# Patient Record
Sex: MALE | Race: WHITE | NOT HISPANIC OR LATINO | Employment: FULL TIME | URBAN - NONMETROPOLITAN AREA
[De-identification: names, ages, dates, MRNs, and addresses within clinical notes are randomized per-mention and may not be internally consistent; named-entity substitution may affect disease eponyms.]

---

## 2018-05-02 ENCOUNTER — OFFICE VISIT (OUTPATIENT)
Dept: URGENT CARE | Facility: PHYSICIAN GROUP | Age: 18
End: 2018-05-02
Payer: COMMERCIAL

## 2018-05-02 VITALS
HEIGHT: 73 IN | BODY MASS INDEX: 29.69 KG/M2 | SYSTOLIC BLOOD PRESSURE: 128 MMHG | WEIGHT: 224 LBS | DIASTOLIC BLOOD PRESSURE: 80 MMHG | HEART RATE: 86 BPM | OXYGEN SATURATION: 100 % | TEMPERATURE: 97.9 F | RESPIRATION RATE: 16 BRPM

## 2018-05-02 DIAGNOSIS — M54.6 ACUTE RIGHT-SIDED THORACIC BACK PAIN: ICD-10-CM

## 2018-05-02 LAB
APPEARANCE UR: CLEAR
BILIRUB UR STRIP-MCNC: NEGATIVE MG/DL
COLOR UR AUTO: YELLOW
GLUCOSE UR STRIP.AUTO-MCNC: NEGATIVE MG/DL
KETONES UR STRIP.AUTO-MCNC: NEGATIVE MG/DL
LEUKOCYTE ESTERASE UR QL STRIP.AUTO: NEGATIVE
NITRITE UR QL STRIP.AUTO: NEGATIVE
PH UR STRIP.AUTO: 7.5 [PH] (ref 5–8)
PROT UR QL STRIP: NEGATIVE MG/DL
RBC UR QL AUTO: NEGATIVE
SP GR UR STRIP.AUTO: 1
UROBILINOGEN UR STRIP-MCNC: NEGATIVE MG/DL

## 2018-05-02 PROCEDURE — 99214 OFFICE O/P EST MOD 30 MIN: CPT | Performed by: FAMILY MEDICINE

## 2018-05-02 PROCEDURE — 81002 URINALYSIS NONAUTO W/O SCOPE: CPT | Performed by: FAMILY MEDICINE

## 2018-05-02 RX ORDER — PREDNISONE 20 MG/1
TABLET ORAL
Qty: 9 TAB | Refills: 0 | Status: SHIPPED | OUTPATIENT
Start: 2018-05-02 | End: 2018-10-08

## 2018-05-02 RX ORDER — TIZANIDINE 4 MG/1
TABLET ORAL
Qty: 28 TAB | Refills: 0 | Status: SHIPPED | OUTPATIENT
Start: 2018-05-02 | End: 2018-10-08

## 2018-05-02 NOTE — PROGRESS NOTES
Chief Complaint:    Chief Complaint   Patient presents with   • Back Pain       History of Present Illness:    This is a new problem. Has severe right-sided thoracic back pain since 4/30/18. Sometimes feels like back locks up. Felt a twinge in back helping friend move a dresser this past weekend and from there has gotten worse. No radiating pain down legs. No loss of bowel/bladder control. No medications taken for this. No history of chronic back pain. 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      Review of Systems:    Constitutional: Negative for fever, chills, and diaphoresis.   Eyes: Negative for change in vision, photophobia, pain, redness, and discharge.  ENT: Negative for ear pain, ear discharge, hearing loss, tinnitus, nasal congestion, nosebleeds, and sore throat.    Respiratory: Negative for cough, hemoptysis, sputum production, shortness of breath, wheezing, and stridor.    Cardiovascular: Negative for chest pain, palpitations, orthopnea, claudication, leg swelling, and PND.   Gastrointestinal: Negative for abdominal pain, nausea, vomiting, diarrhea, constipation, blood in stool, and melena.   Genitourinary: Negative for dysuria, urinary urgency, urinary frequency, hematuria, and flank pain.   Musculoskeletal: See HPI.  Skin: Negative for rash and itching.   Neurological: Negative for dizziness, tingling, tremors, sensory change, speech change, focal weakness, seizures, loss of consciousness, and headaches.   Endo: Negative for polydipsia.   Heme: Does not bruise/bleed easily.   Psychiatric/Behavioral: Negative for depression, suicidal ideas, hallucinations, memory loss and substance abuse. The patient is not nervous/anxious and does not have insomnia.        Past Medical History:    History reviewed. No pertinent past medical history.    Past Surgical History:    History reviewed. No pertinent surgical history.    Social  "History:    Social History     Social History   • Marital status: Single     Spouse name: N/A   • Number of children: N/A   • Years of education: N/A     Occupational History   • Not on file.     Social History Main Topics   • Smoking status: Never Smoker   • Smokeless tobacco: Never Used   • Alcohol use No   • Drug use: No   • Sexual activity: Not on file     Other Topics Concern   • Not on file     Social History Narrative   • No narrative on file     Family History:    History reviewed. No pertinent family history.    Medications:    No current outpatient prescriptions on file prior to visit.     No current facility-administered medications on file prior to visit.      Allergies:    No Known Allergies      Vitals:    Vitals:    05/02/18 1151   BP: 128/80   Pulse: 86   Resp: 16   Temp: 36.6 °C (97.9 °F)   SpO2: 100%   Weight: 101.6 kg (224 lb)   Height: 1.854 m (6' 1\")       Physical Exam:    Constitutional: Vital signs reviewed. Appears well-developed and well-nourished. Appears to have discomfort due to right-sided back pain.   Eyes: Sclera white, conjunctivae clear.  ENT: External ears normal. Hearing normal.  Pulmonary/Chest: Respirations non-labored.  Musculoskeletal: Mildly tender to palpation right thoracic back region. Normal gait. Able to do normal lumbar range of motion, but rotation caused increased sudden pain in right thoracic back region, like it locked up. No muscular atrophy or weakness.  Neurological: Alert and oriented to person, place, and time. Muscle tone normal. Coordination normal. Light touch and sensation normal.   Skin: No rashes or lesions. Warm, dry, normal turgor.  Psychiatric: Normal mood and affect. Behavior is normal. Judgment and thought content normal.     Diagnostics:    POCT URINALYSIS (Order #[id number]) on 5/2/18   Component Results     Component Value Ref Range & Units Status   POC Color Yellow  Negative Final   POC Appearance Clear  Negative Final   POC Leukocyte Esterase " Negative  Negative Final   POC Nitrites Negative  Negative Final   POC Urobiligen Negative  Negative (0.2) mg/dL Final   POC Protein Negative  Negative mg/dL Final   POC Urine PH 7.5  5.0 - 8.0 Final   POC Blood Negative  Negative Final   POC Specific Gravity 1.005  <1.005 - >1.030 Final   POC Ketones Negative  Negative mg/dL Final   POC Bilirubin Negative  Negative mg/dL Final   POC Glucose Negative  Negative mg/dL Final   Last Resulted Time   Wed May 2, 2018 12:15 PM       Assessment / Plan:    1. Acute right-sided thoracic back pain  - POCT Urinalysis  - predniSONE (DELTASONE) 20 MG Tab; 2 TABS ONCE A DAY ON DAYS 1-3, 1 TAB ONCE A DAY ON DAYS 4-6. TAKE WITH FOOD.  Dispense: 9 Tab; Refill: 0  - tizanidine (ZANAFLEX) 4 MG Tab; 1 TAB EVERY 6 HOURS ONLY IF NEEDED FOR PAIN, MUSCLE SPASM, AND/OR MUSCLE TIGHTNESS. MAY CAUSE DROWSINESS.  Dispense: 28 Tab; Refill: 0      Work note given - excuse for 5/2 thru and including 5/4/18.     Discussed with him DDX and management options.    Likely inflammation and/or muscle tightness/spasm as cause of symptoms.    Rec'd relative rest.    Declines Toradol IM.    Agreeable to medications prescribed.    Follow-up with PCP or urgent care if getting worse or not better with time and above.

## 2018-05-02 NOTE — LETTER
May 2, 2018         Patient: Cooper Caldwell   YOB: 2000   Date of Visit: 5/2/2018           To Whom it May Concern:    Cooper Caldwell was seen in my clinic on 5/2/2018.     Please excuse from work for 5/2 thru and including 5/4/18 due to medical condition.    If you have any questions or concerns, please don't hesitate to call.        Sincerely,           Sascha Madison M.D.  Electronically Signed

## 2018-06-06 ENCOUNTER — NON-PROVIDER VISIT (OUTPATIENT)
Dept: URGENT CARE | Facility: PHYSICIAN GROUP | Age: 18
End: 2018-06-06

## 2018-06-06 DIAGNOSIS — Z02.1 PRE-EMPLOYMENT DRUG SCREENING: ICD-10-CM

## 2018-06-06 LAB
AMP AMPHETAMINE: NORMAL
COC COCAINE: NORMAL
INT CON NEG: NORMAL
INT CON POS: NORMAL
MET METHAMPHETAMINES: NORMAL
OPI OPIATES: NORMAL
PCP PHENCYCLIDINE: NORMAL
POC DRUG COMMENT 753798-OCCUPATIONAL HEALTH: NORMAL
THC: NORMAL

## 2018-06-06 PROCEDURE — 80305 DRUG TEST PRSMV DIR OPT OBS: CPT | Performed by: PHYSICIAN ASSISTANT

## 2018-10-08 ENCOUNTER — HOSPITAL ENCOUNTER (EMERGENCY)
Facility: MEDICAL CENTER | Age: 18
End: 2018-10-08
Attending: EMERGENCY MEDICINE

## 2018-10-08 VITALS
OXYGEN SATURATION: 97 % | DIASTOLIC BLOOD PRESSURE: 86 MMHG | TEMPERATURE: 96.4 F | HEIGHT: 73 IN | RESPIRATION RATE: 16 BRPM | SYSTOLIC BLOOD PRESSURE: 130 MMHG | BODY MASS INDEX: 27.76 KG/M2 | WEIGHT: 209.44 LBS | HEART RATE: 72 BPM

## 2018-10-08 DIAGNOSIS — R10.13 EPIGASTRIC ABDOMINAL PAIN: ICD-10-CM

## 2018-10-08 LAB
ALBUMIN SERPL BCP-MCNC: 4.5 G/DL (ref 3.2–4.9)
ALBUMIN/GLOB SERPL: 1.7 G/DL
ALP SERPL-CCNC: 73 U/L (ref 30–99)
ALT SERPL-CCNC: 18 U/L (ref 2–50)
ANION GAP SERPL CALC-SCNC: 9 MMOL/L (ref 0–11.9)
APPEARANCE UR: CLEAR
AST SERPL-CCNC: 18 U/L (ref 12–45)
BASOPHILS # BLD AUTO: 0.6 % (ref 0–1.8)
BASOPHILS # BLD: 0.06 K/UL (ref 0–0.12)
BILIRUB SERPL-MCNC: 0.4 MG/DL (ref 0.1–1.5)
BILIRUB UR QL STRIP.AUTO: NEGATIVE
BUN SERPL-MCNC: 14 MG/DL (ref 8–22)
CALCIUM SERPL-MCNC: 9.9 MG/DL (ref 8.5–10.5)
CHLORIDE SERPL-SCNC: 105 MMOL/L (ref 96–112)
CO2 SERPL-SCNC: 26 MMOL/L (ref 20–33)
COLOR UR: YELLOW
CREAT SERPL-MCNC: 0.8 MG/DL (ref 0.5–1.4)
EOSINOPHIL # BLD AUTO: 0.2 K/UL (ref 0–0.51)
EOSINOPHIL NFR BLD: 2 % (ref 0–6.9)
ERYTHROCYTE [DISTWIDTH] IN BLOOD BY AUTOMATED COUNT: 36.7 FL (ref 35.9–50)
GLOBULIN SER CALC-MCNC: 2.7 G/DL (ref 1.9–3.5)
GLUCOSE SERPL-MCNC: 89 MG/DL (ref 65–99)
GLUCOSE UR STRIP.AUTO-MCNC: NEGATIVE MG/DL
HCT VFR BLD AUTO: 43.3 % (ref 42–52)
HGB BLD-MCNC: 15.7 G/DL (ref 14–18)
IMM GRANULOCYTES # BLD AUTO: 0.02 K/UL (ref 0–0.11)
IMM GRANULOCYTES NFR BLD AUTO: 0.2 % (ref 0–0.9)
KETONES UR STRIP.AUTO-MCNC: NEGATIVE MG/DL
LEUKOCYTE ESTERASE UR QL STRIP.AUTO: NEGATIVE
LIPASE SERPL-CCNC: 35 U/L (ref 11–82)
LYMPHOCYTES # BLD AUTO: 3.2 K/UL (ref 1–4.8)
LYMPHOCYTES NFR BLD: 31.5 % (ref 22–41)
MCH RBC QN AUTO: 31 PG (ref 27–33)
MCHC RBC AUTO-ENTMCNC: 36.3 G/DL (ref 33.7–35.3)
MCV RBC AUTO: 85.4 FL (ref 81.4–97.8)
MICRO URNS: NORMAL
MONOCYTES # BLD AUTO: 0.8 K/UL (ref 0–0.85)
MONOCYTES NFR BLD AUTO: 7.9 % (ref 0–13.4)
NEUTROPHILS # BLD AUTO: 5.89 K/UL (ref 1.82–7.42)
NEUTROPHILS NFR BLD: 57.8 % (ref 44–72)
NITRITE UR QL STRIP.AUTO: NEGATIVE
NRBC # BLD AUTO: 0 K/UL
NRBC BLD-RTO: 0 /100 WBC
PH UR STRIP.AUTO: 8 [PH]
PLATELET # BLD AUTO: 220 K/UL (ref 164–446)
PMV BLD AUTO: 10.3 FL (ref 9–12.9)
POTASSIUM SERPL-SCNC: 3.6 MMOL/L (ref 3.6–5.5)
PROT SERPL-MCNC: 7.2 G/DL (ref 6–8.2)
PROT UR QL STRIP: NEGATIVE MG/DL
RBC # BLD AUTO: 5.07 M/UL (ref 4.7–6.1)
RBC UR QL AUTO: NEGATIVE
SODIUM SERPL-SCNC: 140 MMOL/L (ref 135–145)
SP GR UR STRIP.AUTO: 1.01
UROBILINOGEN UR STRIP.AUTO-MCNC: 0.2 MG/DL
WBC # BLD AUTO: 10.2 K/UL (ref 4.8–10.8)

## 2018-10-08 PROCEDURE — 85025 COMPLETE CBC W/AUTO DIFF WBC: CPT

## 2018-10-08 PROCEDURE — 83690 ASSAY OF LIPASE: CPT

## 2018-10-08 PROCEDURE — 99284 EMERGENCY DEPT VISIT MOD MDM: CPT

## 2018-10-08 PROCEDURE — 81003 URINALYSIS AUTO W/O SCOPE: CPT

## 2018-10-08 PROCEDURE — 80053 COMPREHEN METABOLIC PANEL: CPT

## 2018-10-08 ASSESSMENT — PAIN SCALES - GENERAL: PAINLEVEL_OUTOF10: 2

## 2018-10-08 NOTE — ED TRIAGE NOTES
"Pt to triage .  Chief Complaint   Patient presents with   • Epigastric Pain     off/on    • Tired     \"I feel like I am out of engery\"   slight nausea this am   "

## 2018-10-08 NOTE — ED PROVIDER NOTES
"ED Provider Note    Scribed for Hua Phelan M.D. by Anastasia Fernando. 10/8/2018  3:29 PM    Primary care provider: Pcp Pt States None  Means of arrival: Walk-In  History obtained from: Patient  History limited by: None    CHIEF COMPLAINT  Chief Complaint   Patient presents with   • Epigastric Pain     off/on    • Tired     \"I feel like I am out of engery\"     HPI  Cooper Caldwell is a 18 y.o. male who presents to the Emergency Department complaining of left intermittent upper quadrant pain just beneath his ribs described as a sharp 4/10 pain starting today. Reports intermittent pain with deep inspiration and walking. Additionally, he reports feeling more fatigued than usual feeling like \"I am out of energy\" while at work today. Patient was nauseated this morning and had an episode of emesis which has since resolved. Denies any pertinent past medical history. Denies sore throat. Does not regularly take medications.    REVIEW OF SYSTEMS  Pertinent positives include abdominal pain, nausea, emesis, fatigue. Pertinent negatives include no sore throat, cough.   See HPI for further details.     PAST MEDICAL HISTORY    History reviewed. No pertinent past medical history.    SURGICAL HISTORY  patient denies any surgical history    SOCIAL HISTORY  Social History   Substance Use Topics   • Smoking status: Never Smoker   • Smokeless tobacco: Never Used   • Alcohol use No      History   Drug Use No       FAMILY HISTORY  History reviewed. No pertinent family history.    CURRENT MEDICATIONS  Home Medications     Reviewed by Gracia Anthony R.N. (Registered Nurse) on 10/08/18 at 1512  Med List Status: Complete   Medication Last Dose Status        Patient Varinder Taking any Medications                       ALLERGIES  No Known Allergies    PHYSICAL EXAM  VITAL SIGNS: /73   Pulse 72   Temp (!) 35.8 °C (96.4 °F)   Resp 14   Ht 1.854 m (6' 1\")   Wt 95 kg (209 lb 7 oz)   SpO2 100%   BMI 27.63 kg/m²     Constitutional: Well " developed, Well nourished,no acute distress, Non-toxic appearance.   HENT: Normocephalic, Atraumatic.  Oropharynx moist.   Eyes: PERRL, EOMI, Conjunctiva normal, No discharge.   Neck: no anterior cervical lymphadenopathy  CV: Good pulses  Thorax & Lungs: No respiratory distress.   Abdomen: Mild left upper quadrant tenderness. Soft, No rebound, no peritoneal signs.   Skin: Warm, Dry, No erythema, No rash.    Musculoskeletal: No major deformities noted.   Neurologic: Awake, alert. Moves all extremities spontaneously.  Psychiatric: Affect normal, Mood normal.       LABS  Results for orders placed or performed during the hospital encounter of 10/08/18   CBC WITH DIFFERENTIAL   Result Value Ref Range    WBC 10.2 4.8 - 10.8 K/uL    RBC 5.07 4.70 - 6.10 M/uL    Hemoglobin 15.7 14.0 - 18.0 g/dL    Hematocrit 43.3 42.0 - 52.0 %    MCV 85.4 81.4 - 97.8 fL    MCH 31.0 27.0 - 33.0 pg    MCHC 36.3 (H) 33.7 - 35.3 g/dL    RDW 36.7 35.9 - 50.0 fL    Platelet Count 220 164 - 446 K/uL    MPV 10.3 9.0 - 12.9 fL    Neutrophils-Polys 57.80 44.00 - 72.00 %    Lymphocytes 31.50 22.00 - 41.00 %    Monocytes 7.90 0.00 - 13.40 %    Eosinophils 2.00 0.00 - 6.90 %    Basophils 0.60 0.00 - 1.80 %    Immature Granulocytes 0.20 0.00 - 0.90 %    Nucleated RBC 0.00 /100 WBC    Neutrophils (Absolute) 5.89 1.82 - 7.42 K/uL    Lymphs (Absolute) 3.20 1.00 - 4.80 K/uL    Monos (Absolute) 0.80 0.00 - 0.85 K/uL    Eos (Absolute) 0.20 0.00 - 0.51 K/uL    Baso (Absolute) 0.06 0.00 - 0.12 K/uL    Immature Granulocytes (abs) 0.02 0.00 - 0.11 K/uL    NRBC (Absolute) 0.00 K/uL   COMP METABOLIC PANEL   Result Value Ref Range    Sodium 140 135 - 145 mmol/L    Potassium 3.6 3.6 - 5.5 mmol/L    Chloride 105 96 - 112 mmol/L    Co2 26 20 - 33 mmol/L    Anion Gap 9.0 0.0 - 11.9    Glucose 89 65 - 99 mg/dL    Bun 14 8 - 22 mg/dL    Creatinine 0.80 0.50 - 1.40 mg/dL    Calcium 9.9 8.5 - 10.5 mg/dL    AST(SGOT) 18 12 - 45 U/L    ALT(SGPT) 18 2 - 50 U/L    Alkaline  Phosphatase 73 30 - 99 U/L    Total Bilirubin 0.4 0.1 - 1.5 mg/dL    Albumin 4.5 3.2 - 4.9 g/dL    Total Protein 7.2 6.0 - 8.2 g/dL    Globulin 2.7 1.9 - 3.5 g/dL    A-G Ratio 1.7 g/dL   LIPASE   Result Value Ref Range    Lipase 35 11 - 82 U/L   URINALYSIS,CULTURE IF INDICATED   Result Value Ref Range    Color Yellow     Character Clear     Specific Gravity 1.011 <1.035    Ph 8.0 5.0 - 8.0    Glucose Negative Negative mg/dL    Ketones Negative Negative mg/dL    Protein Negative Negative mg/dL    Bilirubin Negative Negative    Urobilinogen, Urine 0.2 Negative    Nitrite Negative Negative    Leukocyte Esterase Negative Negative    Occult Blood Negative Negative    Micro Urine Req see below    ESTIMATED GFR   Result Value Ref Range    GFR If African American >60 >60 mL/min/1.73 m 2    GFR If Non African American >60 >60 mL/min/1.73 m 2     All labs reviewed by me.    COURSE & MEDICAL DECISION MAKING  Nursing notes, VS, PMSFHx reviewed in chart.    3:29 PM - Patient seen and examined at bedside. Plan of care was discussed which is to order labs to further evaluate. Ordered CBC with differential, CMP, lipase, and urinalysis to evaluate his symptoms.     6:02 PM - Recheck. Patient does feel improved since being in the ED and with laying down however he does report some mild abdominal pain. I do not find any significant or concerning findings given his lab results. I have discussed the possibility of his symptoms being related to viral syndrome. He was given ED return precautions and discharged home. He verbalized his understanding and agrees to the discharge instructions.    The patient will return for new or worsening symptoms and is stable at the time of discharge.    The patient is referred to a primary physician for blood pressure management, diabetic screening, and for all other preventative health concerns.    DISPOSITION:  Patient will be discharged home in stable condition.    FOLLOW UP:  No follow-up provider  specified.    OUTPATIENT MEDICATIONS:  There are no discharge medications for this patient.        FINAL IMPRESSION  1. Epigastric abdominal pain          IAnastasia (Scribe), am scribing for, and in the presence of, Hua Phelan M.D..    Electronically signed by: Anastasia Fernando (Scribe), 10/8/2018    IHua M.D. personally performed the services described in this documentation, as scribed by Anastasia Fernando in my presence, and it is both accurate and complete. E    The note accurately reflects work and decisions made by me.  Hua Phelan  10/8/2018  6:31 PM

## 2018-10-09 NOTE — ED NOTES
RN went through discharge paperwork with the pt. And the pts. Significant other. Pt. Was receptive to instructions and stated he plans to follow up with his PCP

## 2019-04-03 ENCOUNTER — OFFICE VISIT (OUTPATIENT)
Dept: URGENT CARE | Facility: PHYSICIAN GROUP | Age: 19
End: 2019-04-03
Payer: COMMERCIAL

## 2019-04-03 VITALS
OXYGEN SATURATION: 97 % | RESPIRATION RATE: 16 BRPM | HEART RATE: 70 BPM | BODY MASS INDEX: 27.44 KG/M2 | WEIGHT: 208 LBS | DIASTOLIC BLOOD PRESSURE: 70 MMHG | TEMPERATURE: 98.5 F | SYSTOLIC BLOOD PRESSURE: 106 MMHG

## 2019-04-03 DIAGNOSIS — K08.89 PAIN, DENTAL: ICD-10-CM

## 2019-04-03 PROCEDURE — 99214 OFFICE O/P EST MOD 30 MIN: CPT | Performed by: NURSE PRACTITIONER

## 2019-04-03 RX ORDER — CLINDAMYCIN HYDROCHLORIDE 300 MG/1
300 CAPSULE ORAL 3 TIMES DAILY
Qty: 21 QUANTITY SUFFICIENT | Refills: 0 | Status: SHIPPED | OUTPATIENT
Start: 2019-04-03 | End: 2020-07-15

## 2019-04-03 RX ORDER — IBUPROFEN 800 MG/1
800 TABLET ORAL EVERY 8 HOURS PRN
Qty: 90 TAB | Refills: 0 | Status: SHIPPED | OUTPATIENT
Start: 2019-04-03 | End: 2020-07-15

## 2019-04-03 ASSESSMENT — ENCOUNTER SYMPTOMS
MYALGIAS: 0
FEVER: 0
CHILLS: 0
NAUSEA: 0
HEADACHES: 0

## 2019-04-03 NOTE — LETTER
April 3, 2019        Cooper Caldwell  [address] [facility] [address]        Cooper was seen in our clinic today and he is excused from work. Thank you.  If you have any questions or concerns, please don't hesitate to call.        Sincerely,        LOVE Garvey.P.SHANITA.    Electronically Signed

## 2019-04-03 NOTE — PROGRESS NOTES
Subjective:      Cooper Caldwell is a 18 y.o. male who presents with Dental Pain (Dental Appointment 04/12) and Diarrhea (x 4 days  after taking antibiotic from dentist )            HPI New problem. 18 year old male with dental pain for past week and a half and diarrhea for 4 days. He feels the diarrhea is from amoxicillin. He is also taking ibuprofen and tylenol as well as tried naprosyn. Has dental appt on 12 April. No fever, chills or myalgia.   Patient has no known allergies.  No current outpatient prescriptions on file prior to visit.     No current facility-administered medications on file prior to visit.      Social History     Social History   • Marital status: Single     Spouse name: N/A   • Number of children: N/A   • Years of education: N/A     Occupational History   • Not on file.     Social History Main Topics   • Smoking status: Never Smoker   • Smokeless tobacco: Never Used   • Alcohol use No   • Drug use: No   • Sexual activity: Not on file     Other Topics Concern   • Not on file     Social History Narrative   • No narrative on file     family history is not on file.      Review of Systems   Constitutional: Negative for chills, fever and malaise/fatigue.   HENT:        +dental pain   Gastrointestinal: Negative for nausea.   Musculoskeletal: Negative for myalgias.   Neurological: Negative for headaches.          Objective:     /70   Pulse 70   Temp 36.9 °C (98.5 °F) (Temporal)   Resp 16   Wt 94.3 kg (208 lb)   SpO2 97%   BMI 27.44 kg/m²      Physical Exam   Constitutional: He is oriented to person, place, and time. He appears well-developed and well-nourished. No distress.   HENT:   Head: Normocephalic and atraumatic.   Right Ear: External ear and ear canal normal. Tympanic membrane is not injected and not perforated. No middle ear effusion.   Left Ear: External ear and ear canal normal. Tympanic membrane is not injected and not perforated.  No middle ear effusion.   Nose: Mucosal edema  present.   Mouth/Throat: Abnormal dentition. Dental caries present. No oropharyngeal exudate or posterior oropharyngeal erythema.   Eyes: Conjunctivae are normal. Right eye exhibits no discharge. Left eye exhibits no discharge.   Neck: Normal range of motion. Neck supple.   Cardiovascular: Normal rate, regular rhythm and normal heart sounds.    No murmur heard.  Pulmonary/Chest: Effort normal and breath sounds normal. No respiratory distress.   Musculoskeletal: Normal range of motion.   Normal movement of all 4 extremities.   Lymphadenopathy:     He has no cervical adenopathy.        Right: No supraclavicular adenopathy present.        Left: No supraclavicular adenopathy present.   Neurological: He is alert and oriented to person, place, and time. Gait normal.   Skin: Skin is warm and dry.   Psychiatric: He has a normal mood and affect. His behavior is normal. Thought content normal.   Nursing note and vitals reviewed.              Assessment/Plan:     1. Pain, dental  clindamycin (CLEOCIN) 300 MG Cap    ibuprofen (MOTRIN) 800 MG Tab      patient educated on every 4 hour dosing of extra strength tylenol and motrin.  Clindamycin.  Differential diagnosis, natural history, supportive care, and indications for immediate follow-up discussed at length.

## 2019-05-08 ENCOUNTER — APPOINTMENT (OUTPATIENT)
Dept: URGENT CARE | Facility: CLINIC | Age: 19
End: 2019-05-08
Payer: COMMERCIAL

## 2019-06-18 ENCOUNTER — OFFICE VISIT (OUTPATIENT)
Dept: URGENT CARE | Facility: PHYSICIAN GROUP | Age: 19
End: 2019-06-18
Payer: COMMERCIAL

## 2019-06-18 VITALS
TEMPERATURE: 99 F | HEART RATE: 68 BPM | WEIGHT: 206 LBS | OXYGEN SATURATION: 99 % | DIASTOLIC BLOOD PRESSURE: 70 MMHG | RESPIRATION RATE: 16 BRPM | BODY MASS INDEX: 27.18 KG/M2 | SYSTOLIC BLOOD PRESSURE: 122 MMHG

## 2019-06-18 DIAGNOSIS — S60.221A CONTUSION OF RIGHT HAND, INITIAL ENCOUNTER: ICD-10-CM

## 2019-06-18 PROCEDURE — 99214 OFFICE O/P EST MOD 30 MIN: CPT | Performed by: PHYSICIAN ASSISTANT

## 2019-06-18 NOTE — LETTER
June 18, 2019         Patient: Cooper Caldwell   YOB: 2000   Date of Visit: 6/18/2019           To Whom it May Concern:    Cooper Caldwell was seen in my clinic on 6/18/2019. He may return to work on 6/19/19. He will visit the [facility] urgent care to have and xray of his right hand 6/19/19. Please allow limited use of his right hand.    If you have any questions or concerns, please don't hesitate to call.        Sincerely,           Vicki Briscoe P.A.-C.  Electronically Signed

## 2019-06-19 ENCOUNTER — APPOINTMENT (OUTPATIENT)
Dept: RADIOLOGY | Facility: IMAGING CENTER | Age: 19
End: 2019-06-19
Attending: PHYSICIAN ASSISTANT
Payer: COMMERCIAL

## 2019-06-19 DIAGNOSIS — S60.221A CONTUSION OF RIGHT HAND, INITIAL ENCOUNTER: ICD-10-CM

## 2019-06-19 PROCEDURE — 73130 X-RAY EXAM OF HAND: CPT | Mod: 26,RT | Performed by: NURSE PRACTITIONER

## 2019-06-19 NOTE — PROGRESS NOTES
Chief Complaint   Patient presents with   • Hand Pain     hit hand while working on truck (R) hand index and middle finger hurt the worst        HISTORY OF PRESENT ILLNESS: Patient is a 19 y.o. male who presents today for the following:    Patient comes in for evaluation of pain in his right hand.  He was working on his truck yesterday when his hand slipped off the wrench, jamming his hand into the truck.  He has pain on digits 2 and 3.  He has pain with range of motion and any pressure in the area.  He is right-hand dominant.  Over-the-counter medication has not been helping very much.    There are no active problems to display for this patient.      Allergies:Patient has no known allergies.    Current Outpatient Prescriptions Ordered in [facility]   Medication Sig Dispense Refill   • clindamycin (CLEOCIN) 300 MG Cap Take 1 Cap by mouth 3 times a day. (Patient not taking: Reported on 6/18/2019) 21 Quantity Sufficient 0   • ibuprofen (MOTRIN) 800 MG Tab Take 1 Tab by mouth every 8 hours as needed. (Patient not taking: Reported on 6/18/2019) 90 Tab 0     No current Epic-ordered facility-administered medications on file.        No past medical history on file.    Social History   Substance Use Topics   • Smoking status: Never Smoker   • Smokeless tobacco: Never Used   • Alcohol use No       No family status information on file.   No family history on file.    Review of Systems:    Gastrointestinal ROS: No change in bowel habits, No significant change in appetite, No nausea, vomiting, diarrhea, or constipation  Musculoskeletal/Extremities ROS: Right hand pain.  Hematologic/Lymphatic ROS: No chills, No night sweats, No weight loss  Skin/Integumentary ROS: No edema, No evidence of rash, No itching      Exam:  /70   Pulse 68   Temp 37.2 °C (99 °F)   Resp 16   Wt 93.4 kg (206 lb)   SpO2 99%   General: Well developed, well nourished. No distress.  Pulmonary: Unlabored respiratory effort.   Extremities: Tenderness noted  at MCPs 2 and 3 of the right hand extending into the proximal phalanges.  Decreased range of motion at the MCPs due to pain.  Neurologic: Grossly nonfocal. No facial asymmetry noted.  Skin: Warm, dry, good turgor. No rashes in visible areas.   Psych: Normal mood. Alert and oriented x3. Judgment and insight is normal.    X-ray is unavailable at this time.  Splint was placed on the right hand immobilizing MCPs 2 and 3 until he is able to obtain x-ray tomorrow.      Assessment/Plan:  Will contact patient with x-ray results on Thursday.  Will refer to orthopedics if needed.  Otherwise recommend ibuprofen/acetaminophen as needed for pain.  Splint as needed for comfort.   1. Contusion of right hand, initial encounter  DX-HAND 3+ RIGHT     Right hand xray, per radiology:  Impression       No acute fracture identified.

## 2019-06-20 ENCOUNTER — TELEPHONE (OUTPATIENT)
Dept: URGENT CARE | Facility: PHYSICIAN GROUP | Age: 19
End: 2019-06-20

## 2019-06-20 NOTE — TELEPHONE ENCOUNTER
----- Message from Vicki Briscoe P.A.-C. sent at 6/19/2019 10:18 AM PDT -----  Please let pt know his xray is negative for fracture. He can wear that splint as needed for comfort but does not need to wear it.

## 2020-05-18 ENCOUNTER — OFFICE VISIT (OUTPATIENT)
Dept: URGENT CARE | Facility: PHYSICIAN GROUP | Age: 20
End: 2020-05-18
Payer: COMMERCIAL

## 2020-05-18 VITALS
BODY MASS INDEX: 31.14 KG/M2 | DIASTOLIC BLOOD PRESSURE: 62 MMHG | HEART RATE: 76 BPM | RESPIRATION RATE: 16 BRPM | WEIGHT: 236 LBS | OXYGEN SATURATION: 97 % | TEMPERATURE: 97.8 F | SYSTOLIC BLOOD PRESSURE: 124 MMHG

## 2020-05-18 DIAGNOSIS — M62.838 MUSCLE SPASM: ICD-10-CM

## 2020-05-18 DIAGNOSIS — S43.401A SPRAIN OF RIGHT SHOULDER, UNSPECIFIED SHOULDER SPRAIN TYPE, INITIAL ENCOUNTER: ICD-10-CM

## 2020-05-18 PROCEDURE — 99214 OFFICE O/P EST MOD 30 MIN: CPT | Performed by: PHYSICIAN ASSISTANT

## 2020-05-18 RX ORDER — CYCLOBENZAPRINE HCL 10 MG
10 TABLET ORAL 3 TIMES DAILY PRN
Qty: 14 TAB | Refills: 0 | Status: SHIPPED | OUTPATIENT
Start: 2020-05-18 | End: 2023-08-17

## 2020-05-18 ASSESSMENT — ENCOUNTER SYMPTOMS
FEVER: 0
BACK PAIN: 1
TINGLING: 0
SENSORY CHANGE: 0
CHILLS: 0

## 2020-05-18 ASSESSMENT — FIBROSIS 4 INDEX: FIB4 SCORE: 0.42

## 2020-05-18 ASSESSMENT — PAIN SCALES - GENERAL: PAINLEVEL: 7=MODERATE-SEVERE PAIN

## 2020-05-18 NOTE — LETTER
LIANET  [facility] [address] [address]     May 18, 2020    Patient: Cooper Caldwell   YOB: 2000   Date of Visit: 5/18/2020       To Whom It May Concern:    Cooper Caldwell was seen and treated in our department on 5/18/2020. Please excuse from work tomorrow 05/19/20.     Sincerely,     Wander Alexandre P.A.-C.

## 2020-05-19 NOTE — PROGRESS NOTES
Subjective:      Cooper Caldwell is a 20 y.o. male who presents with Shoulder Pain (R shoulder pain x1d)            Shoulder Pain   Pertinent negatives include no chills or fever.   Patient is a 20-year-old male who presents with right shoulder pain.  He states he was working on changing his transmission on his automobile 2 nights ago.  This required strenuous repetitive physical activity with his arms and jerking motions.  He also continued strenuous physical activity throughout the day yesterday.  He states at 1 point yesterday he may have strained his right shoulder with a jerking shoulder flexed motion.  He has mild pain to his right shoulder exacerbated with movements and range of motion. Moderate trapezius pain and soreness. Denies any fevers, chills, numbness, tingling or weakness.  Denies any history of rotator cuff tear or shoulder injury. No other injuries.     Review of Systems   Constitutional: Negative for chills, fever and malaise/fatigue.   Musculoskeletal: Positive for back pain and joint pain.   Neurological: Negative for tingling and sensory change.          Objective:     /62   Pulse 76   Temp 36.6 °C (97.8 °F) (Temporal)   Resp 16   Wt 107 kg (236 lb)   SpO2 97%   BMI 31.14 kg/m²      Physical Exam  Vitals signs reviewed.   Constitutional:       General: He is not in acute distress.     Appearance: Normal appearance. He is not ill-appearing or toxic-appearing.   Eyes:      Conjunctiva/sclera: Conjunctivae normal.      Pupils: Pupils are equal, round, and reactive to light.   Cardiovascular:      Rate and Rhythm: Normal rate.   Pulmonary:      Effort: Pulmonary effort is normal.   Musculoskeletal:      Comments: Right shoulder: Full flexion extension abduction and abduction.  Pain with full flexion.   Strength 5 out of 5 to flexion extension.   Negative empty can test.  Negative Sneed test.  Tenderness to palpation to anterior shoulder.  Tenderness to palpation to trapezius area.  Muscle  spasm to posterior trapezius.  No bony tenderness, deformity, crepitus or overlying skin changes.  Sensation intact.  Distal capillary refill intact.  Upper extremities 5/5 strength  Equal  strength.    Lymphadenopathy:      Cervical: No cervical adenopathy.   Skin:     General: Skin is warm and dry.   Neurological:      General: No focal deficit present.      Mental Status: He is alert and oriented to person, place, and time.   Psychiatric:         Mood and Affect: Mood normal.         Behavior: Behavior normal.       History reviewed. No pertinent past medical history. History reviewed. No pertinent surgical history.   Social History     Socioeconomic History   • Marital status: Single     Spouse name: Not on file   • Number of children: Not on file   • Years of education: Not on file   • Highest education level: Not on file   Occupational History   • Not on file   Social Needs   • Financial resource strain: Not on file   • Food insecurity     Worry: Not on file     Inability: Not on file   • Transportation needs     Medical: Not on file     Non-medical: Not on file   Tobacco Use   • Smoking status: Never Smoker   • Smokeless tobacco: Never Used   Substance and Sexual Activity   • Alcohol use: No   • Drug use: No   • Sexual activity: Not on file   Lifestyle   • Physical activity     Days per week: Not on file     Minutes per session: Not on file   • Stress: Not on file   Relationships   • Social connections     Talks on phone: Not on file     Gets together: Not on file     Attends [employer] service: Not on file     Active member of club or organization: Not on file     Attends meetings of clubs or organizations: Not on file     Relationship status: Not on file   • Intimate partner violence     Fear of current or ex partner: Not on file     Emotionally abused: Not on file     Physically abused: Not on file     Forced sexual activity: Not on file   Other Topics Concern   • Not on file   Social History Narrative    • Not on file    Patient has no known allergies.          History reviewed. No pertinent past medical history. History reviewed. No pertinent surgical history.   Social History     Socioeconomic History   • Marital status: Single     Spouse name: Not on file   • Number of children: Not on file   • Years of education: Not on file   • Highest education level: Not on file   Occupational History   • Not on file   Social Needs   • Financial resource strain: Not on file   • Food insecurity     Worry: Not on file     Inability: Not on file   • Transportation needs     Medical: Not on file     Non-medical: Not on file   Tobacco Use   • Smoking status: Never Smoker   • Smokeless tobacco: Never Used   Substance and Sexual Activity   • Alcohol use: No   • Drug use: No   • Sexual activity: Not on file   Lifestyle   • Physical activity     Days per week: Not on file     Minutes per session: Not on file   • Stress: Not on file   Relationships   • Social connections     Talks on phone: Not on file     Gets together: Not on file     Attends [employer]rian[employer] service: Not on file     Active member of club or organization: Not on file     Attends meetings of clubs or organizations: Not on file     Relationship status: Not on file   • Intimate partner violence     Fear of current or ex partner: Not on file     Emotionally abused: Not on file     Physically abused: Not on file     Forced sexual activity: Not on file   Other Topics Concern   • Not on file   Social History Narrative   • Not on file    Patient has no known allergies.         Assessment/Plan:     1. Muscle spasm    - cyclobenzaprine (FLEXERIL) 10 MG Tab; Take 1 Tab by mouth 3 times a day as needed.  Dispense: 14 Tab; Refill: 0    2. Sprain of right shoulder, unspecified shoulder sprain type, initial encounter    Rest, elevation, ice or heat, ibuprofen 800 mg every 8 hours.    Cyclobenzaprine mostly at night.  Discussed this medication causes drowsiness.  Do not operate machinery  or drive on this medication.  Patient verbalized understanding and agreement.    Return to the urgent care if any worsening symptoms or symptoms are not improving in the next several days.  Discharge instructions handed to patient.     Supportive care, differential diagnoses, and indications for immediate follow-up discussed with patient.    Pathogenesis of diagnosis discussed including typical length and natural progression. Patient expresses understanding and agrees to plan.    Please note that this dictation was created using voice recognition software. I have made every reasonable attempt to correct obvious errors, but I expect that there are errors of grammar and possibly content that I did not discover before finalizing the note.

## 2020-05-19 NOTE — PATIENT INSTRUCTIONS
Muscle Strain  A muscle strain (pulled muscle) happens when a muscle is stretched beyond normal length. It happens when a sudden, violent force stretches your muscle too far. Usually, a few of the fibers in your muscle are torn. Muscle strain is common in athletes. Recovery usually takes 1-2 weeks. Complete healing takes 5-6 weeks.  Follow these instructions at home:  · Follow the PRICE method of treatment to help your injury get better. Do this the first 2-3 days after the injury:  ¨ Protect. Protect the muscle to keep it from getting injured again.  ¨ Rest. Limit your activity and rest the injured body part.  ¨ Ice. Put ice in a plastic bag. Place a towel between your skin and the bag. Then, apply the ice and leave it on from 15-20 minutes each hour. After the third day, switch to moist heat packs.  ¨ Compression. Use a splint or elastic bandage on the injured area for comfort. Do not put it on too tightly.  ¨ Elevate. Keep the injured body part above the level of your heart.  · Only take medicine as told by your doctor.  · Warm up before doing exercise to prevent future muscle strains.  Contact a doctor if:  · You have more pain or puffiness (swelling) in the injured area.  · You feel numbness, tingling, or notice a loss of strength in the injured area.  This information is not intended to replace advice given to you by your health care provider. Make sure you discuss any questions you have with your health care provider.  Document Released: 09/26/2009 Document Revised: 05/25/2017 Document Reviewed: 07/17/2014  [employer] Interactive Patient Education © 2017 [employer]

## 2020-05-27 ENCOUNTER — OFFICE VISIT (OUTPATIENT)
Dept: URGENT CARE | Facility: CLINIC | Age: 20
End: 2020-05-27
Payer: COMMERCIAL

## 2020-05-27 VITALS
WEIGHT: 231.48 LBS | RESPIRATION RATE: 16 BRPM | SYSTOLIC BLOOD PRESSURE: 120 MMHG | HEIGHT: 73 IN | TEMPERATURE: 98 F | OXYGEN SATURATION: 95 % | DIASTOLIC BLOOD PRESSURE: 88 MMHG | BODY MASS INDEX: 30.68 KG/M2 | HEART RATE: 76 BPM

## 2020-05-27 DIAGNOSIS — R19.7 DIARRHEA, UNSPECIFIED TYPE: ICD-10-CM

## 2020-05-27 DIAGNOSIS — R11.2 NON-INTRACTABLE VOMITING WITH NAUSEA, UNSPECIFIED VOMITING TYPE: ICD-10-CM

## 2020-05-27 DIAGNOSIS — J02.9 PHARYNGITIS, UNSPECIFIED ETIOLOGY: ICD-10-CM

## 2020-05-27 DIAGNOSIS — R10.84 GENERALIZED ABDOMINAL PAIN: ICD-10-CM

## 2020-05-27 DIAGNOSIS — R10.31 RLQ ABDOMINAL PAIN: ICD-10-CM

## 2020-05-27 PROCEDURE — 99214 OFFICE O/P EST MOD 30 MIN: CPT | Performed by: NURSE PRACTITIONER

## 2020-05-27 RX ORDER — ONDANSETRON 4 MG/1
4 TABLET, FILM COATED ORAL EVERY 6 HOURS PRN
Qty: 10 TAB | Refills: 0 | Status: SHIPPED | OUTPATIENT
Start: 2020-05-27 | End: 2020-05-30

## 2020-05-27 ASSESSMENT — ENCOUNTER SYMPTOMS
CHILLS: 0
SINUS PAIN: 0
CONSTIPATION: 0
VOMITING: 1
NAUSEA: 1
MYALGIAS: 0
FEVER: 0
FLANK PAIN: 0
BLOOD IN STOOL: 0
SPUTUM PRODUCTION: 0
BACK PAIN: 0
ABDOMINAL PAIN: 1
DIARRHEA: 1
WHEEZING: 0
NECK PAIN: 0
DIAPHORESIS: 0
DIZZINESS: 0
COUGH: 0
HEADACHES: 1
HEMOPTYSIS: 0
SHORTNESS OF BREATH: 0
SORE THROAT: 1
HEARTBURN: 0

## 2020-05-27 ASSESSMENT — FIBROSIS 4 INDEX: FIB4 SCORE: 0.42

## 2020-05-27 NOTE — LETTER
May 27, 2020         Patient: Cooper Caldwell   YOB: 2000   Date of Visit: 5/27/2020           To Whom it May Concern:    Cooper Caldwell was seen in my clinic on 5/27/2020. He may return to work in 2-3 days as symptoms improve.    If you have any questions or concerns, please don't hesitate to call.        Sincerely,           BLAZE Burnette.  Electronically Signed

## 2020-05-27 NOTE — PROGRESS NOTES
"Subjective:      Cooper Caldwell is a 20 y.o. male who presents with Emesis (Diarrhea/vomiting/sore throat, Pt states diarrhea started yesterday, vomiting started this morning. )            Patient comes in today with a 1 history of watery diarrhea, generalized abdominal pain, decreased appetite with mild intermittent nausea, and one episode of dry heaving and vomiting.  Associated factors: headache and a \"scratchy\" sore throat.  Patient has tried nothing to treat the symptoms.  Reports 2-3 episodes of diarrhea since onset of symptoms yesterday.  Denies any bloody stool, mucus, or tarry stool appearance.  Denies any fever, chills, myalgias, otalgia, nasal congestion, cough, chest pain, shortness of breath, heartburn, dysuria, or flank pain.  Denies any ill contacts with similar symptoms.  Denies any suspected food poisoning.  Denies any recent travel or consumption of contaminated water sources.        Review of Systems   Constitutional: Positive for malaise/fatigue. Negative for chills, diaphoresis and fever.   HENT: Positive for sore throat. Negative for congestion, ear pain and sinus pain.    Respiratory: Negative for cough, hemoptysis, sputum production, shortness of breath and wheezing.    Gastrointestinal: Positive for abdominal pain, diarrhea, nausea and vomiting. Negative for blood in stool, constipation, heartburn and melena.   Genitourinary: Negative for dysuria, flank pain, frequency, hematuria and urgency.   Musculoskeletal: Negative for back pain, myalgias and neck pain.   Skin: Negative for rash.   Neurological: Positive for headaches. Negative for dizziness.     Medications, Allergies, and current problem list reviewed today in Epic     Objective:     Blood Pressure 120/88   Pulse 76   Temperature 36.7 °C (98 °F) (Temporal)   Respiration 16   Height 1.854 m (6' 1\")   Weight 105 kg (231 lb 7.7 oz)   Oxygen Saturation 95%   Body Mass Index 30.54 kg/m²      Physical Exam  Vitals signs reviewed. "   Constitutional:       General: He is not in acute distress.     Appearance: Normal appearance. He is well-developed. He is not ill-appearing, toxic-appearing or diaphoretic.   HENT:      Head: Normocephalic.      Right Ear: Tympanic membrane, ear canal and external ear normal. There is no impacted cerumen.      Left Ear: Tympanic membrane, ear canal and external ear normal. There is no impacted cerumen.      Nose: Nose normal. No congestion or rhinorrhea.      Mouth/Throat:      Mouth: Mucous membranes are moist.      Pharynx: Posterior oropharyngeal erythema present. No oropharyngeal exudate.      Comments: Mild generalized oropharyngeal erythema with no exudate or edema.  Uvula midline.  NO muffled voice quality.  Eyes:      General: No scleral icterus.        Right eye: No discharge.         Left eye: No discharge.      Conjunctiva/sclera: Conjunctivae normal.   Neck:      Musculoskeletal: Neck supple. Muscular tenderness present.      Thyroid: No thyromegaly.      Vascular: No JVD.      Trachea: No tracheal deviation.      Comments: Mild anterior neck TTP at the left anterior cervical node chain.  No adenopathy.  No thyroid enlargement or nodules noted.    Cardiovascular:      Rate and Rhythm: Normal rate and regular rhythm.      Heart sounds: Normal heart sounds. No murmur. No friction rub. No gallop.    Pulmonary:      Effort: Pulmonary effort is normal. No respiratory distress.      Breath sounds: Normal breath sounds. No stridor. No wheezing or rales.   Chest:      Chest wall: No tenderness.   Abdominal:      General: Abdomen is flat. Bowel sounds are normal. There is no distension.      Palpations: Abdomen is soft. There is no shifting dullness, fluid wave, hepatomegaly, splenomegaly, mass or pulsatile mass.      Tenderness: There is generalized abdominal tenderness and tenderness in the left lower quadrant. There is no right CVA tenderness, left CVA tenderness, guarding or rebound. Negative signs include  Aguilar's sign, Rovsing's sign and McBurney's sign.      Comments: Mild generalized abdominal pain on palpation with somewhat increased discomfort diffusely through the RLQ.  No focal or rebound tenderness.  Skin is warm, dry, and intact with no bruising, swelling, erythema, rash or lesion.      Lymphadenopathy:      Cervical: No cervical adenopathy.   Skin:     General: Skin is warm and dry.      Coloration: Skin is not pale.      Findings: No erythema.   Neurological:      Mental Status: He is alert and oriented to person, place, and time.       POCT rapid strep a: negative    POCT UA:  Glucose negative  Bili Negative  Ketones Negative  Specific Gravity 1.020  Blood Neative  PH 7.0  Protein Negative  Urobili 0.2  Nitr Negative  Leuks Negative          Assessment/Plan:       1. Generalized abdominal pain     2. RLQ abdominal pain  POCT Urinalysis   3. Diarrhea, unspecified type     4. Non-intractable vomiting with nausea, unspecified vomiting type  ondansetron (ZOFRAN) 4 MG Tab tablet   5. Pharyngitis, unspecified etiology  POCT Rapid Strep A     Advised Cooper that based on the history and exam findings, this is likely a self-limiting viral illness.  There is no indication for antibiotics at this time.  OTC tylenol prn pain.  Zofran as prescribed prn nausea/vomiting.  Advance diet as tolerated.  Maintain adequate po hydration.  RTC in 3-4 days if symptoms persist, sooner if worse.  ED precautions discussed.  Patient verbalized understanding of and agreed with plan of care.

## 2020-05-27 NOTE — PATIENT INSTRUCTIONS
Viral Gastroenteritis, Adult  Viral gastroenteritis is also known as the stomach flu. This condition is caused by various viruses. These viruses can be passed from person to person very easily (are very contagious). This condition may affect your stomach, small intestine, and large intestine. It can cause sudden watery diarrhea, fever, and vomiting.  Diarrhea and vomiting can make you feel weak and cause you to become dehydrated. You may not be able to keep fluids down. Dehydration can make you tired and thirsty, cause you to have a dry mouth, and decrease how often you urinate. Older adults and people with other diseases or a weak immune system are at higher risk for dehydration.  It is important to replace the fluids that you lose from diarrhea and vomiting. If you become severely dehydrated, you may need to get fluids through an IV tube.  What are the causes?  Gastroenteritis is caused by various viruses, including rotavirus and norovirus. Norovirus is the most common cause in adults.  You can get sick by eating food, drinking water, or touching a surface contaminated with one of these viruses. You can also get sick from sharing utensils or other personal items with an infected person.  What increases the risk?  This condition is more likely to develop in people:  · Who have a weak defense system (immune system).  · Who live with one or more children who are younger than 2 years old.  · Who live in a nursing home.  · Who go on cruise ships.  What are the signs or symptoms?  Symptoms of this condition start suddenly 1-2 days after exposure to a virus. Symptoms may last a few days or as long as a week. The most common symptoms are watery diarrhea and vomiting. Other symptoms include:  · Fever.  · Headache.  · Fatigue.  · Pain in the abdomen.  · Chills.  · Weakness.  · Nausea.  · Muscle aches.  · Loss of appetite.  How is this diagnosed?  This condition is diagnosed with a medical history and physical exam. You may  also have a stool test to check for viruses or other infections.  How is this treated?  This condition typically goes away on its own. The focus of treatment is to restore lost fluids (rehydration). Your health care provider may recommend that you take an oral rehydration solution (ORS) to replace important salts and minerals (electrolytes) in your body. Severe cases of this condition may require giving fluids through an IV tube.  Treatment may also include medicine to help with your symptoms.  Follow these instructions at home:  Follow instructions from your health care provider about how to care for yourself at home.  Eating and drinking  Follow these recommendations as told by your health care provider:  · Take an ORS. This is a drink that is sold at pharmacies and retail stores.  · Drink clear fluids in small amounts as you are able. Clear fluids include water, ice chips, diluted fruit juice, and low-calorie sports drinks.  · Eat bland, easy-to-digest foods in small amounts as you are able. These foods include bananas, applesauce, rice, lean meats, toast, and crackers.  · Avoid fluids that contain a lot of sugar or caffeine, such as energy drinks, sports drinks, and soda.  · Avoid alcohol.  · Avoid spicy or fatty foods.  General instructions  · Drink enough fluid to keep your urine clear or pale yellow.  · Wash your hands often. If soap and water are not available, use hand .  · Make sure that all people in your household wash their hands well and often.  · Take over-the-counter and prescription medicines only as told by your health care provider.  · Rest at home while you recover.  · Watch your condition for any changes.  · Take a warm bath to relieve any burning or pain from frequent diarrhea episodes.  · Keep all follow-up visits as told by your health care provider. This is important.  Contact a health care provider if:  · You cannot keep fluids down.  · Your symptoms get worse.  · You have new  symptoms.  · You feel light-headed or dizzy.  · You have muscle cramps.  Get help right away if:  · You have chest pain.  · You feel extremely weak or you faint.  · You see blood in your vomit.  · Your vomit looks like coffee grounds.  · You have bloody or black stools or stools that look like tar.  · You have a severe headache, a stiff neck, or both.  · You have a rash.  · You have severe pain, cramping, or bloating in your abdomen.  · You have trouble breathing or you are breathing very quickly.  · Your heart is beating very quickly.  · Your skin feels cold and clammy.  · You feel confused.  · You have pain when you urinate.  · You have signs of dehydration, such as:  ¨ Dark urine, very little urine, or no urine.  ¨ Cracked lips.  ¨ Dry mouth.  ¨ Sunken eyes.  ¨ Sleepiness.  ¨ Weakness.  This information is not intended to replace advice given to you by your health care provider. Make sure you discuss any questions you have with your health care provider.  Document Released: 12/18/2006 Document Revised: 05/31/2017 Document Reviewed: 08/23/2016  [employer] Interactive Patient Education © 2017 [employer]

## 2020-07-15 ENCOUNTER — OFFICE VISIT (OUTPATIENT)
Dept: URGENT CARE | Facility: CLINIC | Age: 20
End: 2020-07-15
Payer: COMMERCIAL

## 2020-07-15 ENCOUNTER — APPOINTMENT (OUTPATIENT)
Dept: RADIOLOGY | Facility: IMAGING CENTER | Age: 20
End: 2020-07-15
Attending: NURSE PRACTITIONER
Payer: COMMERCIAL

## 2020-07-15 VITALS
DIASTOLIC BLOOD PRESSURE: 70 MMHG | SYSTOLIC BLOOD PRESSURE: 118 MMHG | WEIGHT: 240.96 LBS | RESPIRATION RATE: 16 BRPM | HEART RATE: 73 BPM | OXYGEN SATURATION: 98 % | TEMPERATURE: 98.9 F | HEIGHT: 73 IN | BODY MASS INDEX: 31.94 KG/M2

## 2020-07-15 DIAGNOSIS — M79.605 PAIN OF LEFT LOWER EXTREMITY DUE TO INJURY: ICD-10-CM

## 2020-07-15 DIAGNOSIS — S80.12XA HEMATOMA OF LEG, LEFT, INITIAL ENCOUNTER: ICD-10-CM

## 2020-07-15 PROCEDURE — 99214 OFFICE O/P EST MOD 30 MIN: CPT | Performed by: NURSE PRACTITIONER

## 2020-07-15 PROCEDURE — 73590 X-RAY EXAM OF LOWER LEG: CPT | Mod: TC,LT | Performed by: NURSE PRACTITIONER

## 2020-07-15 ASSESSMENT — ENCOUNTER SYMPTOMS
CONSTIPATION: 0
TINGLING: 0
INABILITY TO BEAR WEIGHT: 0
VOMITING: 0
HEARTBURN: 0
LOSS OF MOTION: 0
WHEEZING: 0
NUMBNESS: 0
HEADACHES: 0
MYALGIAS: 0
DIARRHEA: 0
ORTHOPNEA: 0
DIZZINESS: 0
SHORTNESS OF BREATH: 0
MEMORY LOSS: 0
PALPITATIONS: 0
BACK PAIN: 0
SORE THROAT: 0
FEVER: 0
CHILLS: 0
LOSS OF SENSATION: 0
COUGH: 0
MUSCLE WEAKNESS: 0
NAUSEA: 0

## 2020-07-15 ASSESSMENT — FIBROSIS 4 INDEX: FIB4 SCORE: 0.42

## 2020-07-15 NOTE — PROGRESS NOTES
"Subjective:      Cooper Caldwell is a 20 y.o. 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    Leg Injury    The incident occurred more than 1 week ago. The incident occurred at home. The injury mechanism was a direct blow. The pain is present in the left leg. The quality of the pain is described as aching. The pain is at a severity of 3/10. The pain is mild. The pain has been improving since onset. Pertinent negatives include no inability to bear weight, loss of motion, loss of sensation, muscle weakness, numbness or tingling. He reports no foreign bodies present. The symptoms are aggravated by weight bearing and palpation. He has tried nothing for the symptoms. The treatment provided no relief.       Review of Systems   Constitutional: Negative for chills and fever.   HENT: Negative for ear pain and sore throat.    Respiratory: Negative for cough, shortness of breath and wheezing.    Cardiovascular: Negative for chest pain, palpitations, orthopnea and leg swelling.   Gastrointestinal: Negative for constipation, diarrhea, heartburn, nausea and vomiting.   Musculoskeletal: Negative for back pain, joint pain and myalgias.        Left lower leg pain   Skin: Negative for rash.   Neurological: Negative for dizziness, tingling, numbness and headaches.   Psychiatric/Behavioral: Negative for memory loss and suicidal ideas.   All other systems reviewed and are negative.         Objective:     /70   Pulse 73   Temp 37.2 °C (98.9 °F) (Temporal)   Resp 16   Ht 1.854 m (6' 1\")   Wt 109.3 kg (240 lb 15.4 oz)   SpO2 98%   BMI 31.79 kg/m²      Physical Exam  Vitals signs reviewed.   Constitutional:       General: He is not in acute distress.     Appearance: Normal appearance. He is not ill-appearing.   HENT:      Head: Normocephalic.      Right Ear: External ear " normal.      Left Ear: External ear normal.      Nose: Nose normal.      Mouth/Throat:      Mouth: Mucous membranes are moist.   Eyes:      Extraocular Movements: Extraocular movements intact.      Conjunctiva/sclera: Conjunctivae normal.   Neck:      Musculoskeletal: Normal range of motion.   Cardiovascular:      Rate and Rhythm: Normal rate and regular rhythm.      Pulses: Normal pulses.   Pulmonary:      Effort: Pulmonary effort is normal.   Abdominal:      Palpations: Abdomen is soft.   Musculoskeletal: Normal range of motion.      Left lower leg: He exhibits tenderness, swelling and laceration ( Left shin: Superficial well healing abrasion overlying soft tissue swelling without surrounding erythema or warmth). He exhibits no deformity. No edema.        Legs:    Skin:     General: Skin is warm and dry.      Capillary Refill: Capillary refill takes less than 2 seconds.   Neurological:      Mental Status: He is alert and oriented to person, place, and time.   Psychiatric:         Mood and Affect: Mood normal.         Behavior: Behavior normal.       HISTORY/REASON FOR EXAM:  Left mid shaft tibia and fibula pain after striking leg on boat motor 2 weeks prior.        TECHNIQUE/EXAM DESCRIPTION AND NUMBER OF VIEWS:  2 views of the LEFT tibia and fibula.     COMPARISON: None     FINDINGS:  Bone mineralization is age appropriate. Bony alignment is anatomic. The ankle mortise is symmetric. There is no evidence of acute fracture or dislocation.     IMPRESSION:     No radiographic evidence of acute traumatic injury.                    Assessment/Plan:     1. Hematoma of leg, left, initial encounter  Xray: no fracture or dislocation per radiology  Recommend warm compresses, OTC ibuprofen as needed.    Plan of care, medications and treatments reviewed with patient and or guardian.  Patient and or guardian voices understanding and agrees with the instructions provided. After visit summary reviewed with patient. Patient and  or guardian understand the parameters for reevaluation and ER precautions discussed.     Follow up with primary care provider in the next 1-5 days for recheck as needed.  Discussed that urgent care setting has limited resources, therefore any worsening of symptoms should be evaluated in the ER. Patient and or guardian verbalized understanding.     Please note that this dictation was created using voice recognition software. I have made every reasonable attempt to correct obvious errors, but I expect that there are errors of grammar and possibly content that I did not discover before finalizing the note.     - DX-TIBIA AND FIBULA LEFT; Future

## 2021-07-15 ENCOUNTER — OFFICE VISIT (OUTPATIENT)
Dept: URGENT CARE | Facility: PHYSICIAN GROUP | Age: 21
End: 2021-07-15
Payer: COMMERCIAL

## 2021-07-15 VITALS
DIASTOLIC BLOOD PRESSURE: 88 MMHG | RESPIRATION RATE: 16 BRPM | HEIGHT: 73 IN | OXYGEN SATURATION: 96 % | WEIGHT: 269 LBS | HEART RATE: 87 BPM | SYSTOLIC BLOOD PRESSURE: 122 MMHG | TEMPERATURE: 98.1 F | BODY MASS INDEX: 35.65 KG/M2

## 2021-07-15 DIAGNOSIS — S05.01XA ABRASION OF RIGHT CORNEA, INITIAL ENCOUNTER: ICD-10-CM

## 2021-07-15 PROCEDURE — 99214 OFFICE O/P EST MOD 30 MIN: CPT | Performed by: PHYSICIAN ASSISTANT

## 2021-07-15 RX ORDER — POLYMYXIN B SULFATE AND TRIMETHOPRIM 1; 10000 MG/ML; [USP'U]/ML
SOLUTION OPHTHALMIC
Qty: 10 ML | Refills: 0 | Status: SHIPPED | OUTPATIENT
Start: 2021-07-15 | End: 2023-08-17

## 2021-07-15 NOTE — LETTER
July 15, 2021         Patient: Cooper Caldwell   YOB: 2000   Date of Visit: 7/15/2021           To Whom it May Concern:    Cooper Caldwell was seen in my clinic on 7/15/2021. Patient may return to work.     If you have any questions or concerns, please don't hesitate to call.        Sincerely,           Wander Alexandre P.A.-C.  Electronically Signed

## 2021-07-16 NOTE — PROGRESS NOTES
"Subjective:   Cooper Caldwell is a 21 y.o. male who presents for Eye Problem (Foreign object in R eye, x3days )      HPI  Patient presents to clinic with complaints of foreign body  sensation to his right eye onset 3 days ago.  Denies any injury or trauma.  Denies any exposure to small foreign body material.  He was not at work prior to incident.  This feels mildly irritated with minimal discomfort.  Denies any vision changes.  Eye irrigation without relief.  He does not wear eye contacts.  Denies any fever, chills, double vision, photophobia, drainage.    ROS    Medications:    • cyclobenzaprine Tabs    Allergies: Patient has no known allergies.    Problem List: Cooper Caldwell does not have a problem list on file.    Surgical History:  No past surgical history on file.    Past Social Hx: Cooper Caldwell  reports that he has never smoked. His smokeless tobacco use includes chew. He reports previous drug use. Frequency: 2.00 times per week. Drug: Marijuana. He reports that he does not drink alcohol.     Past Family Hx:  Cooper Caldwell family history is not on file.     Problem list, medications, and allergies reviewed by myself today in Epic.     Objective:     /88   Pulse 87   Temp 36.7 °C (98.1 °F) (Temporal)   Resp 16   Ht 1.854 m (6' 1\")   Wt 122 kg (269 lb)   SpO2 96%   BMI 35.49 kg/m²     Physical Exam  Vitals reviewed.   Constitutional:       General: He is not in acute distress.     Appearance: Normal appearance. He is not ill-appearing or toxic-appearing.   Eyes:      General: Lids are normal. Vision grossly intact.         Right eye: No foreign body.      Extraocular Movements: Extraocular movements intact.      Conjunctiva/sclera:      Right eye: Right conjunctiva is injected (mild to medial eye ). No exudate or hemorrhage.     Left eye: Left conjunctiva is not injected.      Pupils: Pupils are equal, round, and reactive to light.      Right eye: Corneal abrasion and fluorescein uptake present. Forrest exam " negative.        Comments: Upper and lower right eyelids everted and no FB visualized    Cardiovascular:      Rate and Rhythm: Normal rate.   Pulmonary:      Effort: Pulmonary effort is normal.   Musculoskeletal:      Cervical back: Neck supple.   Skin:     General: Skin is warm and dry.   Neurological:      General: No focal deficit present.      Mental Status: He is alert and oriented to person, place, and time.   Psychiatric:         Mood and Affect: Mood normal.         Behavior: Behavior normal.         Assessment/Associated Orders     1. Abrasion of right cornea, initial encounter  polymixin-trimethoprim (POLYTRIM) 22095-4.1 UNIT/ML-% Solution       Medical Decision Making      No FB visualized on exam.   S/S consistent with corneal abrasion  Patient does not wear eye contacts  Polytrim prophylactic antibiotics prescribed.  Avoid rubbing eyes  Hand hygiene  If no improvement in 1 to 2 days or any worsening, it was recommended he follow-up immediately with an eye doctor.  Recommended [facility] if she did not have any eye doctor.    I personally reviewed prior external notes and test results pertinent to today's visit. Red flags discussed and indications to present to the Emergency Department. Supportive care, natural history, differential diagnoses, and indications for immediate follow-up discussed. Patient expresses understanding and agrees to plan. Patient denies any other questions or concerns.     Advised the patient to follow-up with the primary care physician for recheck, reevaluation, and consideration of further management.    Time spent evaluating the patient was 30 minutes which included preparing for the visit, obtaining history, examination, ordering labs/tests/procedures/medications, independent interpretation, discussion of plan, counseling/education, medical information reconciliation, and documentation into chart.     Please note that this dictation was created using voice  recognition software. I have made a reasonable attempt to correct obvious errors, but I expect that there are errors of grammar and possibly content that I did not discover before finalizing the note.    This note was electronically signed by Wander Alexandre PA-C

## 2021-07-17 ASSESSMENT — VISUAL ACUITY: OU: 1

## 2022-03-16 ENCOUNTER — APPOINTMENT (OUTPATIENT)
Dept: RADIOLOGY | Facility: IMAGING CENTER | Age: 22
End: 2022-03-16
Attending: NURSE PRACTITIONER
Payer: COMMERCIAL

## 2022-03-16 ENCOUNTER — OFFICE VISIT (OUTPATIENT)
Dept: URGENT CARE | Facility: PHYSICIAN GROUP | Age: 22
End: 2022-03-16
Payer: COMMERCIAL

## 2022-03-16 VITALS
DIASTOLIC BLOOD PRESSURE: 70 MMHG | HEART RATE: 104 BPM | SYSTOLIC BLOOD PRESSURE: 114 MMHG | RESPIRATION RATE: 16 BRPM | OXYGEN SATURATION: 97 % | TEMPERATURE: 97.8 F | HEIGHT: 76 IN | BODY MASS INDEX: 30.56 KG/M2 | WEIGHT: 251 LBS

## 2022-03-16 DIAGNOSIS — R10.32 LEFT LOWER QUADRANT ABDOMINAL PAIN: ICD-10-CM

## 2022-03-16 DIAGNOSIS — K59.00 CONSTIPATION, UNSPECIFIED CONSTIPATION TYPE: ICD-10-CM

## 2022-03-16 PROCEDURE — 99213 OFFICE O/P EST LOW 20 MIN: CPT | Performed by: NURSE PRACTITIONER

## 2022-03-16 PROCEDURE — 74018 RADEX ABDOMEN 1 VIEW: CPT | Mod: TC,FY | Performed by: NURSE PRACTITIONER

## 2022-03-16 ASSESSMENT — ENCOUNTER SYMPTOMS
WEAKNESS: 0
ABDOMINAL PAIN: 1
FLATUS: 0
VOMITING: 0
ORTHOPNEA: 0
WEIGHT LOSS: 0
HEARTBURN: 0
DIZZINESS: 0
FEVER: 0
HEADACHES: 0
FLANK PAIN: 0
DIARRHEA: 0
HEMATOCHEZIA: 0
MYALGIAS: 0
PALPITATIONS: 0
SHORTNESS OF BREATH: 0
CONSTIPATION: 0
BELCHING: 0
CHILLS: 0
ANOREXIA: 0
NAUSEA: 0

## 2022-03-16 ASSESSMENT — CROHNS DISEASE ACTIVITY INDEX (CDAI): CDAI SCORE: 0

## 2022-03-16 NOTE — PROGRESS NOTES
Subjective     Cooper Caldwell is a 21 y.o. male who presents with LUQ Pain (X1day, dull pain without movement, sharp when moving )            States left side abdominal pain x 4 days. Denies food allergies, no meds or any regular prescribed or over the counter med used. No regular alcohol use, no history of diabetes. No changes in bowel movements. No dysuria or hematuria, no history of kidney stones or kidney infections. No epigastric pain, no history of GERD or pancreatitis. Has had episodes of constipation in past but not on regular basis. Mild abdominal distention. Increased dsicomfort with movement only on left side.     LUQ Pain  This is a new problem. The current episode started yesterday. The onset quality is sudden. The problem occurs intermittently. The problem has been unchanged. The pain is located in the LUQ and LLQ. The pain is mild. The quality of the pain is aching. The abdominal pain does not radiate. Pertinent negatives include no anorexia, belching, constipation, diarrhea, dysuria, fever, flatus, frequency, headaches, hematochezia, hematuria, melena, myalgias, nausea, vomiting or weight loss. Nothing aggravates the pain. There is no history of abdominal surgery, colon cancer, Crohn's disease, gallstones, irritable bowel syndrome, pancreatitis, PUD or ulcerative colitis.       Review of Systems   Constitutional: Negative for chills, fever, malaise/fatigue and weight loss.   Respiratory: Negative for shortness of breath.    Cardiovascular: Negative for chest pain, palpitations and orthopnea.   Gastrointestinal: Positive for abdominal pain. Negative for anorexia, constipation, diarrhea, flatus, heartburn, hematochezia, melena, nausea and vomiting.   Genitourinary: Negative for dysuria, flank pain, frequency, hematuria and urgency.   Musculoskeletal: Negative for myalgias.   Neurological: Negative for dizziness, weakness and headaches.   All other systems reviewed and are negative.    PMH:  has no past  "medical history on file.  MEDS:   Current Outpatient Medications:   •  polymixin-trimethoprim (POLYTRIM) 86918-1.1 UNIT/ML-% Solution, Administer 1 drop into affected eye 4 times daily for 5 days (Patient not taking: Reported on 3/16/2022), Disp: 10 mL, Rfl: 0  •  cyclobenzaprine (FLEXERIL) 10 MG Tab, Take 1 Tab by mouth 3 times a day as needed. (Patient not taking: No sig reported), Disp: 14 Tab, Rfl: 0  ALLERGIES: No Known Allergies  SURGHX: History reviewed. No pertinent surgical history.  SOCHX:  reports that he has never smoked. His smokeless tobacco use includes chew. He reports previous drug use. Frequency: 2.00 times per week. Drug: Marijuana. He reports that he does not drink alcohol.  FH: Family history was reviewed, no pertinent findings to report           Objective     /70   Pulse (!) 104   Temp 36.6 °C (97.8 °F) (Temporal)   Resp 16   Ht 1.93 m (6' 4\")   Wt 114 kg (251 lb)   SpO2 97%   BMI 30.55 kg/m²      Physical Exam  Vitals reviewed.   Constitutional:       General: He is not in acute distress.     Appearance: Normal appearance. He is well-developed. He is not ill-appearing, toxic-appearing or diaphoretic.   HENT:      Head: Normocephalic.   Eyes:      Conjunctiva/sclera: Conjunctivae normal.      Pupils: Pupils are equal, round, and reactive to light.   Cardiovascular:      Rate and Rhythm: Normal rate.   Pulmonary:      Effort: Pulmonary effort is normal. No tachypnea, accessory muscle usage or respiratory distress.      Breath sounds: Normal breath sounds and air entry.   Abdominal:      General: Bowel sounds are normal. There is no distension.      Palpations: Abdomen is soft.      Tenderness: There is abdominal tenderness in the left upper quadrant and left lower quadrant. There is no left CVA tenderness, guarding or rebound.   Musculoskeletal:         General: Normal range of motion.      Cervical back: Neck supple.   Skin:     General: Skin is warm and dry.   Neurological:      " Mental Status: He is alert and oriented to person, place, and time.   Psychiatric:         Attention and Perception: Attention normal.         Mood and Affect: Mood normal.         Speech: Speech normal.         Behavior: Behavior normal. Behavior is cooperative.                abd xray IMPRESSION:     1.  No evidence of bowel obstruction. Nonspecific bowel gas pattern.  2.  Average to moderate amount of colonic stool in the ascending colon.             Assessment & Plan        1. Left lower quadrant abdominal pain    - [id number] VIEW; Future      2. Constipation, unspecified constipation type    -Increase water intake  -Recommend over the counter laxative like Miralax daily x 1 week then as needed   -May use over the counter stool softener like Colace as needed x 1 week then as needed   -Encourage fibrous food intake and balanced diet including fruits and vegetables, decrease sugar and processed foods  -May introduce a fiber supplement daily like Metamucil   -Monitor for severe abdominal pain with fever and inability to have bowel movement, sharyn blood with bowel movement, fever, nausea/vomiting- may need to go to ER, patient understands this

## 2023-08-17 ENCOUNTER — TELEMEDICINE (OUTPATIENT)
Dept: TELEHEALTH | Facility: TELEMEDICINE | Age: 23
End: 2023-08-17
Payer: COMMERCIAL

## 2023-08-17 DIAGNOSIS — T30.0 BURN: ICD-10-CM

## 2023-08-17 DIAGNOSIS — R09.81 NASAL CONGESTION: ICD-10-CM

## 2023-08-17 PROCEDURE — 99213 OFFICE O/P EST LOW 20 MIN: CPT | Mod: 95

## 2023-08-17 RX ORDER — DEXAMETHASONE 4 MG/1
6 TABLET ORAL DAILY
Qty: 3 TABLET | Refills: 0 | Status: SHIPPED | OUTPATIENT
Start: 2023-08-17 | End: 2023-08-20

## 2023-08-17 RX ORDER — FLUTICASONE PROPIONATE 50 MCG
2 SPRAY, SUSPENSION (ML) NASAL
Qty: 16 G | Refills: 1 | Status: SHIPPED | OUTPATIENT
Start: 2023-08-17 | End: 2024-02-23

## 2023-08-17 NOTE — PROGRESS NOTES
Virtual Visit: Established Patient   This visit was conducted via Zoom using secure and encrypted videoconferencing technology.   The patient was in a private location outside of their home in the state [employer] [facility] [employer].    The patient's identity was confirmed and verbal consent was obtained for this virtual visit.    Subjective:   CC: No chief complaint on file.    Cooper Caldwell is a 23 y.o. male presenting for evaluation and management of:  Ongoing nasal congestion for the last several weeks, over the last 2 days he notes increasing pressure especially to the left side.  He denies any fevers.  No sputum production.  No major shortness of breath, no cough.  No history related otherwise.  He has not taken anything for this.    Patient also has a secondary complaint of a small burn to his left wrist after working on a car.  Patient states this occurred on Sunday, he has been keeping it open to air.  He notes ongoing mild redness and tenderness to the site.      ROS   Review of Systems   Constitutional: Negative for chills, fever, malaise/fatigue and weight loss.   Respiratory: Negative for shortness of breath.  Notes nasal congestion, no sputum production, feels a pressure to the left side of his nose.  Skin: Burn to the left wrist     Current medicines (including changes today)  Current Outpatient Medications   Medication Sig Dispense Refill    fluticasone (FLONASE) 50 MCG/ACT nasal spray Administer 2 Sprays into affected nostril(S) at bedtime. 16 g 1    dexamethasone (DECADRON) 4 MG Tab Take 1.5 Tablets by mouth every day for 3 days. 3 Tablet 0    silver sulfADIAZINE (SILVADENE) 1 % Cream Apply 25 g topically every day. 1 Each 3     No current facility-administered medications for this visit.       There are no problems to display for this patient.       Objective:   There were no vitals taken for this visit.    Physical Exam:  Constitutional: Alert, no distress, well-groomed.  Skin: No rashes in visible areas.  Eye: Round.  Conjunctiva clear, lids normal. No icterus.   ENMT: Lips pink without lesions, good dentition, moist mucous membranes. Phonation normal.  Neck: No masses, no thyromegaly. Moves freely without pain.  Respiratory: Unlabored respiratory effort, no cough or audible wheeze.  No impairment of speech  Psych: Alert and oriented x3, normal affect and mood.   Skin: Noted second-degree burn to the left wrist.  Mild swelling and redness.    Assessment and Plan:   The following treatment plan was discussed:   Cooper Caldwell is a 23 y.o. male presenting for evaluation and management of:  Ongoing nasal congestion for the last several weeks, over the last 2 days he notes increasing pressure especially to the left side.  He denies any fevers.  No sputum production.  No major shortness of breath, no cough.  No history related otherwise.  He has not taken anything for this.    Patient also has a secondary complaint of a small burn to his left wrist after working on a car.  Patient states this occurred on Sunday, he has been keeping it open to air.  He notes ongoing mild redness and tenderness to the site.    Physical exam patient has no noted impairment of speech.  He appears in no major respiratory distress.  Based on patient's review of systems as well as physical I do not think he requires antibiotics at this time.  Flonase and Decadron sent to the pharmacy, reviewed plan of care with the patient, encouraged to humidifier as well.    For his burn on his wrist I will go ahead and provide Silvadene cream, patient advised to keep the area clean, follow-up if he continues to get worse or does not improve.    1. Nasal congestion  - fluticasone (FLONASE) 50 MCG/ACT nasal spray; Administer 2 Sprays into affected nostril(S) at bedtime.  Dispense: 16 g; Refill: 1  - dexamethasone (DECADRON) 4 MG Tab; Take 1.5 Tablets by mouth every day for 3 days.  Dispense: 3 Tablet; Refill: 0    2. Burn  - silver sulfADIAZINE (SILVADENE) 1 % Cream; Apply 25  g topically every day.  Dispense: 1 Each; Refill: 3      Follow-up: Return if symptoms worsen or fail to improve.

## 2023-08-18 DIAGNOSIS — T30.0 BURN: ICD-10-CM

## 2023-11-16 ENCOUNTER — OFFICE VISIT (OUTPATIENT)
Dept: URGENT CARE | Facility: PHYSICIAN GROUP | Age: 23
End: 2023-11-16
Payer: COMMERCIAL

## 2023-11-16 ENCOUNTER — APPOINTMENT (OUTPATIENT)
Dept: TELEHEALTH | Facility: TELEMEDICINE | Age: 23
End: 2023-11-16
Payer: COMMERCIAL

## 2023-11-16 VITALS
RESPIRATION RATE: 14 BRPM | HEART RATE: 102 BPM | SYSTOLIC BLOOD PRESSURE: 118 MMHG | HEIGHT: 73 IN | DIASTOLIC BLOOD PRESSURE: 78 MMHG | OXYGEN SATURATION: 97 % | WEIGHT: 275 LBS | BODY MASS INDEX: 36.45 KG/M2 | TEMPERATURE: 97.9 F

## 2023-11-16 DIAGNOSIS — M94.0 COSTOCHONDRITIS: ICD-10-CM

## 2023-11-16 PROCEDURE — 3078F DIAST BP <80 MM HG: CPT | Performed by: FAMILY MEDICINE

## 2023-11-16 PROCEDURE — 3074F SYST BP LT 130 MM HG: CPT | Performed by: FAMILY MEDICINE

## 2023-11-16 PROCEDURE — 93000 ELECTROCARDIOGRAM COMPLETE: CPT | Performed by: FAMILY MEDICINE

## 2023-11-16 PROCEDURE — 99214 OFFICE O/P EST MOD 30 MIN: CPT | Performed by: FAMILY MEDICINE

## 2023-11-16 NOTE — LETTER
November 16, 2023         Patient: Cooper Caldwell   YOB: 2000   Date of Visit: 11/16/2023           To Whom it May Concern:    Cooper Caldwell was seen in my clinic on 11/16/2023.      Please excuse absence due to illness for 11/16 and 11/17.       If you have any questions or concerns, please don't hesitate to call.        Sincerely,           Ray Angel M.D.  Electronically Signed

## 2023-11-17 NOTE — PROGRESS NOTES
"   Subjective:       presents with chest Pain            Chest Pain  This is a new problem. Episode onset: 4 d ago. The onset quality is undetermined. The problem occurs intermittently. The problem is unchanged. The pain is present in the right side and left side (left and right ribcage). The pain is mild. The quality of the pain is described as sharp. The symptoms are aggravated by breathing. Pertinent negatives include no coughing, dizziness, fever, headaches, nausea, syncope, tingling or wheezing. Past treatments include nothing.       Social History     Tobacco Use    Smoking status: Never    Smokeless tobacco: Current     Types: Chew   Vaping Use    Vaping Use: Never used   Substance Use Topics    Alcohol use: No    Drug use: Not Currently     Frequency: 2.0 times per week     Types: Marijuana     Comment: weekends         No past medical history on file.      Review of Systems   Constitutional: Negative for fever.   Respiratory: Negative for cough and wheezing.    Cardiovascular: Positive for chest pain. Negative for syncope.   Gastrointestinal: Negative for nausea.   Neurological: Negative for dizziness, tingling and headaches.   All other systems reviewed and are negative.         Objective:     /78   Pulse (!) 102   Temp 36.6 °C (97.9 °F) (Temporal)   Resp 14   Ht 1.854 m (6' 1\")   Wt 125 kg (275 lb)   SpO2 97%     Physical Exam   Constitutional: patient is oriented to person, place, and time. Pt appears well-developed and well-nourished. No distress.   HENT:   Head: Normocephalic and atraumatic.   Eyes: Conjunctivae are normal. No scleral icterus.   Neck: Neck supple.   Cardiovascular: Normal rate, regular rhythm and normal heart sounds.  Exam reveals no gallop and no friction rub.    Pulmonary/Chest: Effort normal and breath sounds normal. No respiratory distress. Pt has no wheezes, rales. She exhibits tenderness (tender over costochondral junction on left and right).   Abdominal: Soft. Bowel " sounds are normal. She exhibits no distension. There is no tenderness.   Neurological: pt is alert and oriented to person, place, and time. No cranial nerve deficit.   Skin: Skin is warm. Pt is not diaphoretic. No erythema.   Psychiatric: Patient's behavior is normal.   Nursing note and vitals reviewed.         EKG interpretation - normal sinus rhythm. No ST or QRS morphology changes. QT interval is normal. Axis is positive. No signs of ischemia.    Assessment/Plan:     1. Costochondritis   EKG reassuring    rx motrin 800mg tid prn     Differential diagnosis, natural history, supportive care, and indications for immediate follow-up discussed. All questions answered. Patient agrees with the plan of care.     Follow-up as needed if symptoms worsen or fail to improve to PCP, Urgent care or Emergency Room.     I have personally reviewed prior external notes and test results pertinent to today's visit.  I have independently reviewed and interpreted all diagnostics ordered during this urgent care acute visit.         My total time spent caring for the patient on the day of the encounter was 30 minutes.   This does not include time spent on separately billable procedures/tests.

## 2024-02-23 ENCOUNTER — APPOINTMENT (OUTPATIENT)
Dept: URGENT CARE | Facility: CLINIC | Age: 24
End: 2024-02-23
Payer: COMMERCIAL

## 2024-02-23 ENCOUNTER — OFFICE VISIT (OUTPATIENT)
Dept: URGENT CARE | Facility: PHYSICIAN GROUP | Age: 24
End: 2024-02-23
Payer: COMMERCIAL

## 2024-02-23 VITALS
BODY MASS INDEX: 36.98 KG/M2 | WEIGHT: 279 LBS | HEART RATE: 94 BPM | HEIGHT: 73 IN | OXYGEN SATURATION: 98 % | DIASTOLIC BLOOD PRESSURE: 70 MMHG | SYSTOLIC BLOOD PRESSURE: 124 MMHG | TEMPERATURE: 98.8 F | RESPIRATION RATE: 14 BRPM

## 2024-02-23 DIAGNOSIS — S81.012A LACERATION OF LEFT KNEE, INITIAL ENCOUNTER: ICD-10-CM

## 2024-02-23 PROCEDURE — 3078F DIAST BP <80 MM HG: CPT | Performed by: PHYSICIAN ASSISTANT

## 2024-02-23 PROCEDURE — 3074F SYST BP LT 130 MM HG: CPT | Performed by: PHYSICIAN ASSISTANT

## 2024-02-23 PROCEDURE — 99214 OFFICE O/P EST MOD 30 MIN: CPT | Performed by: PHYSICIAN ASSISTANT

## 2024-02-23 RX ORDER — CEPHALEXIN 500 MG/1
500 CAPSULE ORAL 4 TIMES DAILY
Qty: 28 CAPSULE | Refills: 0 | Status: SHIPPED | OUTPATIENT
Start: 2024-02-23 | End: 2024-03-01

## 2024-02-23 ASSESSMENT — ENCOUNTER SYMPTOMS
FALLS: 1
NEUROLOGICAL NEGATIVE: 1
RESPIRATORY NEGATIVE: 1
TINGLING: 0
NUMBNESS: 0
MUSCLE WEAKNESS: 0
LOSS OF SENSATION: 0
GASTROINTESTINAL NEGATIVE: 1
CARDIOVASCULAR NEGATIVE: 1
LOSS OF MOTION: 0
CONSTITUTIONAL NEGATIVE: 1

## 2024-02-24 NOTE — PROGRESS NOTES
"Subjective     Cooper Caldwell is a very pleasant 23 y.o. male who presents with Leg Injury (Fell and hit (L) knee on the stairs, cut his knee)            Large irregular skin tear on the anterior left knee in the infrapatellar region.  There was a skin flap which he did debride.  Now he has a large open wound.  We have no record of his immunizations but he states he received a tetanus within the last 5 years.    Leg Injury   The incident occurred less than 1 hour ago. The incident occurred at a nursing home. The injury mechanism was a fall. The pain is present in the left knee. Pertinent negatives include no loss of motion, loss of sensation, muscle weakness, numbness or tingling. He reports no foreign bodies present. The symptoms are aggravated by movement and weight bearing. He has tried nothing for the symptoms. The treatment provided no relief.       PMH:  has no past medical history on file.  MEDS:   Current Outpatient Medications:     cephALEXin (KEFLEX) 500 MG Cap, Take 1 Capsule by mouth 4 times a day for 7 days., Disp: 28 Capsule, Rfl: 0  ALLERGIES: No Known Allergies  SURGHX: No past surgical history on file.  SOCHX:  reports that he has never smoked. His smokeless tobacco use includes chew. He reports that he does not currently use drugs after having used the following drugs: Marijuana. Frequency: 2.00 times per week. He reports that he does not drink alcohol.  FH: family history is not on file.      Review of Systems   Constitutional: Negative.    Respiratory: Negative.     Cardiovascular: Negative.    Gastrointestinal: Negative.    Musculoskeletal:  Positive for falls.   Skin:         Knee lac   Neurological: Negative.  Negative for tingling and numbness.       Medications, Allergies, and current problem list reviewed today in Epic           Objective     /70   Pulse 94   Temp 37.1 °C (98.8 °F) (Temporal)   Resp 14   Ht 1.854 m (6' 1\")   Wt (!) 127 kg (279 lb)   SpO2 98%   BMI 36.81 kg/m²  "     Physical Exam  Vitals and nursing note reviewed.   Constitutional:       General: He is not in acute distress.     Appearance: Normal appearance. He is well-developed. He is not diaphoretic.   HENT:      Head: Normocephalic.      Right Ear: Hearing and external ear normal.      Left Ear: Hearing and external ear normal.      Nose: Nose normal.      Mouth/Throat:      Mouth: Mucous membranes are moist.   Eyes:      General:         Right eye: No discharge.         Left eye: No discharge.      Conjunctiva/sclera: Conjunctivae normal.   Cardiovascular:      Rate and Rhythm: Normal rate and regular rhythm.   Pulmonary:      Effort: Pulmonary effort is normal. No respiratory distress.      Breath sounds: Normal breath sounds.   Musculoskeletal:      Cervical back: Normal range of motion and neck supple.   Skin:     General: Skin is warm and dry.      Findings: Laceration present.             Comments: Large irregular skin tear of the infrapatellar region of the left leg.  He did debride the large overlying skin flap.  Therefore there is no ability to pull the wound edges together/closed the wound.  There is no foreign body.  No underlying joint pain.  Full passive and active range of motion.  Gait normal.   Neurological:      General: No focal deficit present.      Mental Status: He is alert and oriented to person, place, and time. Mental status is at baseline.   Psychiatric:         Mood and Affect: Mood normal.         Behavior: Behavior normal.         Thought Content: Thought content normal.         Judgment: Judgment normal.                             Assessment & Plan     This is a very pleasant 23-year-old male presenting with a left knee laceration obtained 1 hour ago after a fall at home.  There is a large skin tear.  He did debride the skin flap at home.  Therefore, there is a large open crater is wound with no possibility of closure.  There is no foreign body.  No underlying bony tenderness and he has  full range of motion.  No immunization records but patient does state he received a tetanus within the last 5 years.  Advised patient that due to removal of skin flap I am unable to close the wound.  Wound was copiously flushed and irrigated.  A clean dressing was applied.  Wound care discussed, watch out for infection.  Did advise patient that this wound will take 2 to 3 weeks to heal.  Avoid aggravating maneuvers and squatting.  I will place patient on Keflex as this is a deeper wound that will take a prolonged amount of time to heal.     1. Laceration of left knee, initial encounter  cephALEXin (KEFLEX) 500 MG Cap             I personally reviewed prior external notes and test results pertinent to today's visit. Return to clinic or go to ED if symptoms worsen or persist. Red flag symptoms and indications for ED discussed at length. Patient/Parent/Guardian voices understanding.  AVS with post-visit instructions provided or given verbally.  Follow-up with your primary care provider in 3-5 days. All side effects and potential interactions of prescribed medication discussed including allergic response, GI upset, tendon injury, rash, sedation, OCP effectiveness, etc.    Please note that this dictation was created using voice recognition software. I have made every reasonable attempt to correct obvious errors, but I expect that there are errors of grammar and possibly content that I did not discover before finalizing the note.

## 2024-04-02 ENCOUNTER — OFFICE VISIT (OUTPATIENT)
Dept: URGENT CARE | Facility: PHYSICIAN GROUP | Age: 24
End: 2024-04-02
Payer: COMMERCIAL

## 2024-04-02 VITALS
OXYGEN SATURATION: 96 % | WEIGHT: 276 LBS | RESPIRATION RATE: 18 BRPM | SYSTOLIC BLOOD PRESSURE: 118 MMHG | HEIGHT: 73 IN | TEMPERATURE: 97.9 F | BODY MASS INDEX: 36.58 KG/M2 | DIASTOLIC BLOOD PRESSURE: 72 MMHG | HEART RATE: 100 BPM

## 2024-04-02 DIAGNOSIS — R07.9 CHEST PAIN, UNSPECIFIED TYPE: ICD-10-CM

## 2024-04-02 PROCEDURE — 3074F SYST BP LT 130 MM HG: CPT

## 2024-04-02 PROCEDURE — 3078F DIAST BP <80 MM HG: CPT

## 2024-04-02 PROCEDURE — 99213 OFFICE O/P EST LOW 20 MIN: CPT

## 2024-04-03 ASSESSMENT — ENCOUNTER SYMPTOMS
PALPITATIONS: 0
COUGH: 0
DIZZINESS: 0
HEADACHES: 0

## 2024-04-03 NOTE — PROGRESS NOTES
"Subjective:   Cooper Caldwell is a 23 y.o. male who presents for Chest Pain (X3-4 days on and off sharp stabbing pain,L side upper chest to Shoulder and L arm )      HPI: This is a 23-year-old male who presents today for chest pain.  This is a new problem.  Patient reports developing chest pain over the last 3 to 4 days.  He reports the discomfort is on the left side of his chest and states that he has some slight discomfort of his left arm.  He reports his pain is 3\10.  He does not have any underlying cardiac history.  He denies taking medications chronically.  He denies any shortness of breath or cough.  Nothing makes the pain worse or better.      Review of Systems   Respiratory:  Negative for cough.    Cardiovascular:  Positive for chest pain. Negative for palpitations and leg swelling.   Neurological:  Negative for dizziness and headaches.       Medications:    No current outpatient medications on file prior to visit.     No current facility-administered medications on file prior to visit.        Allergies:   Patient has no known allergies.    Problem List:   There is no problem list on file for this patient.       Surgical History:  No past surgical history on file.    Past Social Hx:   Social History     Tobacco Use    Smoking status: Never    Smokeless tobacco: Current     Types: Chew   Vaping Use    Vaping Use: Never used   Substance Use Topics    Alcohol use: No    Drug use: Not Currently     Frequency: 2.0 times per week     Types: Marijuana     Comment: weekends          Problem list, medications, and allergies reviewed by myself today in Epic.     Objective:     /72   Pulse 100   Temp 36.6 °C (97.9 °F) (Temporal)   Resp 18   Ht 1.854 m (6' 1\")   Wt (!) 125 kg (276 lb)   SpO2 96%   BMI 36.41 kg/m²     Physical Exam  Vitals and nursing note reviewed.   Constitutional:       General: He is not in acute distress.     Appearance: Normal appearance. He is normal weight. He is not ill-appearing, " toxic-appearing or diaphoretic.   HENT:      Head: Normocephalic and atraumatic.      Nose: Nose normal.      Mouth/Throat:      Mouth: Mucous membranes are moist.      Pharynx: Oropharynx is clear. No oropharyngeal exudate or posterior oropharyngeal erythema.   Cardiovascular:      Rate and Rhythm: Normal rate and regular rhythm.      Pulses: Normal pulses.      Heart sounds: Normal heart sounds. No murmur heard.     No friction rub. No gallop.   Pulmonary:      Effort: Pulmonary effort is normal. No respiratory distress.      Breath sounds: Normal breath sounds. No stridor. No wheezing, rhonchi or rales.   Chest:      Chest wall: No tenderness.   Musculoskeletal:      Cervical back: Normal range of motion and neck supple. No tenderness.   Lymphadenopathy:      Cervical: No cervical adenopathy.   Skin:     General: Skin is warm and dry.      Capillary Refill: Capillary refill takes less than 2 seconds.   Neurological:      General: No focal deficit present.      Mental Status: He is alert and oriented to person, place, and time. Mental status is at baseline.      Cranial Nerves: No cranial nerve deficit.      Motor: No weakness.      Gait: Gait normal.   Psychiatric:         Mood and Affect: Mood normal.         Behavior: Behavior normal.         Thought Content: Thought content normal.         Judgment: Judgment normal.         Assessment/Plan:     Diagnosis and associated orders:   1. Chest pain, unspecified type  EKG - Clinic Performed    Referral to establish with PCP             Comments/MDM:   Pt is clinically stable at today's acute urgent care visit.  No acute distress noted. Appropriate for outpatient management at this time.     Acute problem.  Patient presents today for chest pain over the last 3 to 4 days.  EKG obtained and shows normal sinus rhythm, heart rate 93, no ST elevation, no T wave abnormalities, no ischemic changes noted.  EKG is reassuring.  The patient does not have any underlying  cardiovascular history.  There is low suspicion for any true cardiac event occurring at this time.  I have discussed trialing ibuprofen for pain and monitoring his symptoms.  Patient was given strict ER precautions for any worsening in his symptoms or for symptoms that persist.  He is agreeable with this plan.  Referral placed to establish with PCP.           Discussed DDx, management options (risks,benefits, and alternatives to planned treatment), natural progression and supportive care.  Expressed understanding and the treatment plan was agreed upon. Questions were encouraged and answered   Return to urgent care prn if new or worsening sx or if there is no improvement in condition prn.    Educated in Red flags and indications to immediately call 911 or present to the Emergency Department.   Advised the patient to follow-up with the primary care physician for recheck, reevaluation, and consideration of further management.    I personally reviewed prior external notes and test results pertinent to today's visit.  I have independently reviewed and interpreted all diagnostics ordered during this urgent care acute visit.     Please note that this dictation was created using voice recognition software. I have made a reasonable attempt to correct obvious errors, but I expect that there are errors of grammar and possibly content that I did not discover before finalizing the note.    This note was electronically signed by KATHY Ngo

## 2024-04-05 ENCOUNTER — HOSPITAL ENCOUNTER (EMERGENCY)
Facility: MEDICAL CENTER | Age: 24
End: 2024-04-05
Attending: EMERGENCY MEDICINE
Payer: COMMERCIAL

## 2024-04-05 VITALS
BODY MASS INDEX: 37.31 KG/M2 | OXYGEN SATURATION: 95 % | DIASTOLIC BLOOD PRESSURE: 77 MMHG | HEART RATE: 85 BPM | HEIGHT: 73 IN | TEMPERATURE: 97.4 F | SYSTOLIC BLOOD PRESSURE: 132 MMHG | RESPIRATION RATE: 18 BRPM | WEIGHT: 281.53 LBS

## 2024-04-05 DIAGNOSIS — M54.12 CERVICAL RADICULOPATHY: ICD-10-CM

## 2024-04-05 LAB — EKG IMPRESSION: NORMAL

## 2024-04-05 PROCEDURE — A9270 NON-COVERED ITEM OR SERVICE: HCPCS | Performed by: EMERGENCY MEDICINE

## 2024-04-05 PROCEDURE — 93005 ELECTROCARDIOGRAM TRACING: CPT | Performed by: EMERGENCY MEDICINE

## 2024-04-05 PROCEDURE — 700102 HCHG RX REV CODE 250 W/ 637 OVERRIDE(OP): Performed by: EMERGENCY MEDICINE

## 2024-04-05 PROCEDURE — 99283 EMERGENCY DEPT VISIT LOW MDM: CPT

## 2024-04-05 PROCEDURE — 700111 HCHG RX REV CODE 636 W/ 250 OVERRIDE (IP): Performed by: EMERGENCY MEDICINE

## 2024-04-05 RX ORDER — PREDNISONE 20 MG/1
60 TABLET ORAL ONCE
Status: COMPLETED | OUTPATIENT
Start: 2024-04-05 | End: 2024-04-05

## 2024-04-05 RX ORDER — CYCLOBENZAPRINE HCL 5 MG
5-10 TABLET ORAL 3 TIMES DAILY PRN
Qty: 20 TABLET | Refills: 0 | Status: SHIPPED | OUTPATIENT
Start: 2024-04-05

## 2024-04-05 RX ORDER — CYCLOBENZAPRINE HCL 10 MG
10 TABLET ORAL ONCE
Status: COMPLETED | OUTPATIENT
Start: 2024-04-05 | End: 2024-04-05

## 2024-04-05 RX ORDER — METHYLPREDNISOLONE 4 MG/1
TABLET ORAL
Qty: 21 EACH | Refills: 0 | Status: SHIPPED | OUTPATIENT
Start: 2024-04-05

## 2024-04-05 RX ADMIN — PREDNISONE 60 MG: 20 TABLET ORAL at 14:29

## 2024-04-05 RX ADMIN — CYCLOBENZAPRINE 10 MG: 10 TABLET, FILM COATED ORAL at 14:06

## 2024-04-05 NOTE — ED PROVIDER NOTES
"ER Provider Note    Scribed for Tyrell Shafer M.D. by Veronica Her. 4/5/2024   1:26 PM    Primary Care Provider: None noted    CHIEF COMPLAINT  Chief Complaint   Patient presents with    Arm Pain     X3 days in left shoulder and arm. He had EKG completed at  on Tuesday. Was instructed to take ibuprofen and rest it but reports shooting pain into hand.      EXTERNAL RECORDS REVIEWED  Outpatient Notes Patient was seen 3 days ago for chest pain     HPI/ROS  LIMITATION TO HISTORY   Select: : None  OUTSIDE HISTORIAN(S):  None    Cooper Caldwell is a 23 y.o. male who presents to the ED for evaluation of arm pain onset three days ago. Patient states he had an EKG at Urgent Care four days ago and was instructed to take Ibuprofen and rest. Patient states his pain was on his left upper chest when his pain first began. He reports his pain is shooting into his hand. He reports having a new bed and may have been rough on his side. Patient describes feeling a few sharp neck pains. No alleviating or exacerbating factors noted. No known drug allergies.     PAST MEDICAL HISTORY  History reviewed. No pertinent past medical history.    SURGICAL HISTORY  History reviewed. No pertinent surgical history.    FAMILY HISTORY  History reviewed. No pertinent family history.    SOCIAL HISTORY   reports that he has never smoked. His smokeless tobacco use includes chew. He reports that he does not currently use drugs after having used the following drugs: Marijuana. Frequency: 2.00 times per week. He reports that he does not drink alcohol.    CURRENT MEDICATIONS  No current outpatient medications    ALLERGIES  No Known Allergies     PHYSICAL EXAM  /81   Pulse 94   Temp 36.1 °C (97 °F) (Temporal)   Resp 16   Ht 1.854 m (6' 1\")   Wt (!) 128 kg (281 lb 8.4 oz)   SpO2 97%   BMI 37.14 kg/m²    Constitutional: No acute distress  HENT: Normocephalic, Atraumatic.  Oropharynx moist.   Eyes: EOMI, Conjunctiva normal, No discharge.   Neck: " Slight left side neck tenderness to palpation.   CV: Good pulses  Thorax & Lungs: No respiratory distress.   Skin: Warm, Dry, No rash noted    Musculoskeletal: No major deformities noted. Non tender throughout his shoulder, elbow, wrist, hand.  Neurologic: Awake, alert. Moves all extremities spontaneously. Muscle strength 5/5 throughout.   Psychiatric: Affect normal, Mood normal.     DIAGNOSTIC STUDIES  Labs:   Results for orders placed or performed during the hospital encounter of 24   EKG   Result Value Ref Range    Report       [facility] Emergency Dept.    Test Date:  2024  Pt Name:    JULIUS JACKSON            Department: ER  MRN:        [id number]                      Room:       ORTHO  Gender:     Male                         Technician: [id number]  :        2000                   Requested By:LUDMILA HEADLEY  Order #:    [id number]                    Reading MD: LUDMILA HEADLEY MD    Measurements  Intervals                                Axis  Rate:       76                           P:          31  RI:         133                          QRS:        48  QRSD:       93                           T:          11  QT:         365  QTc:        411    Interpretive Statements  Sinus rhythm  No previous ECG available for comparison  Electronically Signed On 2024 13:51:50 PDT by LUDMILA HEADLEY MD       COURSE & MEDICAL DECISION MAKING     INITIAL ASSESSMENT, COURSE AND PLAN    Care Narrative: Patient with right arm pain, it is primarily over the C6 distribution on the left arm, no musculoskeletal tenderness, slight left cervical tenderness, likely cervical radiculopathy.  Will put the patient on a Medrol Dosepak, Flexeril, have the patient heat, massage, return with worsening symptoms.    1:26 PM - Patient was evaluated at bedside. Ordered for EKG to evaluate. The patient will be medicated with Flexeril 10 mg for his symptoms. Discussed plan of care with patient  which includes treating his symptoms with outpatient medication. I then informed the patient of my plan for discharge, which includes strict return precautions for any new or worsening symptoms. Patient understands and verbalizes agreement to plan of care. Patient is comfortable going home at this time.    DISPOSITION AND DISCUSSIONS    I have discussed management of the patient with the following physicians and ADENIKE's: None    Discussion of management with other Q or appropriate source(s): None     Escalation of care considered, and ultimately not performed: diagnostic imaging.    Barriers to care at this time, including but not limited to: Patient does not have established PCP.     Decision tools and prescription drugs considered including, but not limited to:  Medrol Dosepak .    The patient will return for new or worsening symptoms and is stable at the time of discharge.    DISPOSITION:  Patient will be discharged home in stable condition.    FOLLOW UP:  [facility], Emergency Dept  [address] [place] [id number] [phone]    If symptoms worsen    OUTPATIENT MEDICATIONS:  Discharge Medication List as of 4/5/2024  2:34 PM        START taking these medications    Details   methylPREDNISolone (MEDROL DOSEPAK) 4 MG Tablet Therapy Pack Use as directed, Disp-21 Each, R-0, Normal      cyclobenzaprine (FLEXERIL) 5 mg tablet Take 1-2 Tablets by mouth 3 times a day as needed for Moderate Pain or Mild Pain., Disp-20 Tablet, R-0, Normal           FINAL DIAGNOSIS  1. Cervical radiculopathy       Veronica MARIEE (Benita), am scribing for, and in the presence of, Tyrell Shafer M.D..    Electronically signed by: Veronica Her (Benita), 4/5/2024    Tyrell MARIEE M.D. personally performed the services described in this documentation, as scribed by Veronica Her in my presence, and it is both accurate and complete.      The note accurately reflects work and decisions made by me.  Tyrell  MIHIR Shafer  4/5/2024  4:02 PM

## 2024-04-05 NOTE — ED TRIAGE NOTES
"Chief Complaint   Patient presents with    Arm Pain     X3 days in left shoulder and arm. He had EKG completed at  on Tuesday. Was instructed to take ibuprofen and rest it but reports shooting pain into hand.        Patient to triage ambulatory with a steady gait, AAOx4, Appropriate precautions in place.     Explained wait time and triage process. Placed back in lobby. Told to notify ED tech or RN of any changes, verbalized understanding.    /81   Pulse 94   Temp 36.1 °C (97 °F) (Temporal)   Resp 16   Ht 1.854 m (6' 1\")   Wt (!) 128 kg (281 lb 8.4 oz)   SpO2 97%   BMI 37.14 kg/m²     "

## 2024-05-31 ENCOUNTER — TELEPHONE (OUTPATIENT)
Dept: HEALTH INFORMATION MANAGEMENT | Facility: OTHER | Age: 24
End: 2024-05-31
Payer: COMMERCIAL

## 2024-07-30 ENCOUNTER — OFFICE VISIT (OUTPATIENT)
Dept: MEDICAL GROUP | Facility: PHYSICIAN GROUP | Age: 24
End: 2024-07-30
Payer: COMMERCIAL

## 2024-07-30 VITALS
TEMPERATURE: 98.1 F | BODY MASS INDEX: 36.98 KG/M2 | WEIGHT: 279 LBS | SYSTOLIC BLOOD PRESSURE: 118 MMHG | HEART RATE: 69 BPM | DIASTOLIC BLOOD PRESSURE: 86 MMHG | OXYGEN SATURATION: 96 % | HEIGHT: 73 IN | RESPIRATION RATE: 12 BRPM

## 2024-07-30 DIAGNOSIS — Z11.3 SCREEN FOR STD (SEXUALLY TRANSMITTED DISEASE): ICD-10-CM

## 2024-07-30 DIAGNOSIS — Z11.59 ENCOUNTER FOR HEPATITIS C SCREENING TEST FOR LOW RISK PATIENT: ICD-10-CM

## 2024-07-30 DIAGNOSIS — M54.12 CERVICAL RADICULOPATHY AT C7: ICD-10-CM

## 2024-07-30 DIAGNOSIS — R79.89 ELEVATED LFTS: ICD-10-CM

## 2024-07-30 DIAGNOSIS — Z00.00 PHYSICAL EXAM, ANNUAL: ICD-10-CM

## 2024-07-30 ASSESSMENT — PATIENT HEALTH QUESTIONNAIRE - PHQ9: CLINICAL INTERPRETATION OF PHQ2 SCORE: 0

## 2024-07-31 ENCOUNTER — APPOINTMENT (OUTPATIENT)
Dept: RADIOLOGY | Facility: IMAGING CENTER | Age: 24
End: 2024-07-31
Attending: PHYSICIAN ASSISTANT
Payer: COMMERCIAL

## 2024-07-31 ENCOUNTER — HOSPITAL ENCOUNTER (OUTPATIENT)
Dept: LAB | Facility: MEDICAL CENTER | Age: 24
End: 2024-07-31
Attending: PHYSICIAN ASSISTANT
Payer: COMMERCIAL

## 2024-07-31 ENCOUNTER — NON-PROVIDER VISIT (OUTPATIENT)
Dept: URGENT CARE | Facility: PHYSICIAN GROUP | Age: 24
End: 2024-07-31
Payer: COMMERCIAL

## 2024-07-31 DIAGNOSIS — Z11.59 ENCOUNTER FOR HEPATITIS C SCREENING TEST FOR LOW RISK PATIENT: ICD-10-CM

## 2024-07-31 DIAGNOSIS — Z00.00 PHYSICAL EXAM, ANNUAL: ICD-10-CM

## 2024-07-31 DIAGNOSIS — M54.12 CERVICAL RADICULOPATHY AT C7: ICD-10-CM

## 2024-07-31 DIAGNOSIS — Z11.3 SCREEN FOR STD (SEXUALLY TRANSMITTED DISEASE): ICD-10-CM

## 2024-07-31 LAB
ALBUMIN SERPL BCP-MCNC: 4.4 G/DL (ref 3.2–4.9)
ALBUMIN/GLOB SERPL: 1.4 G/DL
ALP SERPL-CCNC: 80 U/L (ref 30–99)
ALT SERPL-CCNC: 53 U/L (ref 2–50)
ANION GAP SERPL CALC-SCNC: 10 MMOL/L (ref 7–16)
AST SERPL-CCNC: 23 U/L (ref 12–45)
BASOPHILS # BLD AUTO: 0.8 % (ref 0–1.8)
BASOPHILS # BLD: 0.08 K/UL (ref 0–0.12)
BILIRUB SERPL-MCNC: 0.6 MG/DL (ref 0.1–1.5)
BUN SERPL-MCNC: 10 MG/DL (ref 8–22)
CALCIUM ALBUM COR SERPL-MCNC: 9.3 MG/DL (ref 8.5–10.5)
CALCIUM SERPL-MCNC: 9.6 MG/DL (ref 8.5–10.5)
CHLORIDE SERPL-SCNC: 106 MMOL/L (ref 96–112)
CHOLEST SERPL-MCNC: 166 MG/DL (ref 100–199)
CO2 SERPL-SCNC: 25 MMOL/L (ref 20–33)
CREAT SERPL-MCNC: 0.86 MG/DL (ref 0.5–1.4)
EOSINOPHIL # BLD AUTO: 0.57 K/UL (ref 0–0.51)
EOSINOPHIL NFR BLD: 6 % (ref 0–6.9)
ERYTHROCYTE [DISTWIDTH] IN BLOOD BY AUTOMATED COUNT: 38.9 FL (ref 35.9–50)
EST. AVERAGE GLUCOSE BLD GHB EST-MCNC: 111 MG/DL
FASTING STATUS PATIENT QL REPORTED: NORMAL
GFR SERPLBLD CREATININE-BSD FMLA CKD-EPI: 122 ML/MIN/1.73 M 2
GLOBULIN SER CALC-MCNC: 3.2 G/DL (ref 1.9–3.5)
GLUCOSE SERPL-MCNC: 97 MG/DL (ref 65–99)
HBA1C MFR BLD: 5.5 % (ref 4–5.6)
HCT VFR BLD AUTO: 48.8 % (ref 42–52)
HCV AB SER QL: NORMAL
HDLC SERPL-MCNC: 37 MG/DL
HGB BLD-MCNC: 16.9 G/DL (ref 14–18)
HIV 1+2 AB+HIV1 P24 AG SERPL QL IA: NORMAL
IMM GRANULOCYTES # BLD AUTO: 0.04 K/UL (ref 0–0.11)
IMM GRANULOCYTES NFR BLD AUTO: 0.4 % (ref 0–0.9)
LDLC SERPL CALC-MCNC: 102 MG/DL
LYMPHOCYTES # BLD AUTO: 2.91 K/UL (ref 1–4.8)
LYMPHOCYTES NFR BLD: 30.7 % (ref 22–41)
MCH RBC QN AUTO: 29.9 PG (ref 27–33)
MCHC RBC AUTO-ENTMCNC: 34.6 G/DL (ref 32.3–36.5)
MCV RBC AUTO: 86.2 FL (ref 81.4–97.8)
MONOCYTES # BLD AUTO: 0.83 K/UL (ref 0–0.85)
MONOCYTES NFR BLD AUTO: 8.8 % (ref 0–13.4)
NEUTROPHILS # BLD AUTO: 5.04 K/UL (ref 1.82–7.42)
NEUTROPHILS NFR BLD: 53.3 % (ref 44–72)
NRBC # BLD AUTO: 0 K/UL
NRBC BLD-RTO: 0 /100 WBC (ref 0–0.2)
PLATELET # BLD AUTO: 241 K/UL (ref 164–446)
PMV BLD AUTO: 11.2 FL (ref 9–12.9)
POTASSIUM SERPL-SCNC: 4.3 MMOL/L (ref 3.6–5.5)
PROT SERPL-MCNC: 7.6 G/DL (ref 6–8.2)
RBC # BLD AUTO: 5.66 M/UL (ref 4.7–6.1)
SODIUM SERPL-SCNC: 141 MMOL/L (ref 135–145)
TRIGL SERPL-MCNC: 133 MG/DL (ref 0–149)
TSH SERPL DL<=0.005 MIU/L-ACNC: 2.08 UIU/ML (ref 0.38–5.33)
WBC # BLD AUTO: 9.5 K/UL (ref 4.8–10.8)

## 2024-07-31 PROCEDURE — 80061 LIPID PANEL: CPT

## 2024-07-31 PROCEDURE — 87389 HIV-1 AG W/HIV-1&-2 AB AG IA: CPT

## 2024-07-31 PROCEDURE — 84443 ASSAY THYROID STIM HORMONE: CPT

## 2024-07-31 PROCEDURE — 85025 COMPLETE CBC W/AUTO DIFF WBC: CPT

## 2024-07-31 PROCEDURE — 36415 COLL VENOUS BLD VENIPUNCTURE: CPT

## 2024-07-31 PROCEDURE — 72040 X-RAY EXAM NECK SPINE 2-3 VW: CPT | Mod: TC,FY | Performed by: RADIOLOGY

## 2024-07-31 PROCEDURE — 80053 COMPREHEN METABOLIC PANEL: CPT

## 2024-07-31 PROCEDURE — 83036 HEMOGLOBIN GLYCOSYLATED A1C: CPT

## 2024-07-31 PROCEDURE — 86803 HEPATITIS C AB TEST: CPT

## 2024-08-06 ENCOUNTER — OFFICE VISIT (OUTPATIENT)
Dept: MEDICAL GROUP | Facility: PHYSICIAN GROUP | Age: 24
End: 2024-08-06
Payer: COMMERCIAL

## 2024-08-06 VITALS
HEART RATE: 82 BPM | HEIGHT: 73 IN | TEMPERATURE: 97.1 F | OXYGEN SATURATION: 95 % | BODY MASS INDEX: 37.11 KG/M2 | RESPIRATION RATE: 12 BRPM | DIASTOLIC BLOOD PRESSURE: 78 MMHG | SYSTOLIC BLOOD PRESSURE: 126 MMHG | WEIGHT: 280 LBS

## 2024-08-06 DIAGNOSIS — R79.89 ELEVATED LFTS: ICD-10-CM

## 2024-08-06 DIAGNOSIS — E66.09 CLASS 2 OBESITY DUE TO EXCESS CALORIES WITHOUT SERIOUS COMORBIDITY WITH BODY MASS INDEX (BMI) OF 36.0 TO 36.9 IN ADULT: ICD-10-CM

## 2024-08-06 DIAGNOSIS — Z00.00 PHYSICAL EXAM, ANNUAL: ICD-10-CM

## 2024-08-06 PROCEDURE — 3074F SYST BP LT 130 MM HG: CPT | Performed by: PHYSICIAN ASSISTANT

## 2024-08-06 PROCEDURE — 99395 PREV VISIT EST AGE 18-39: CPT | Performed by: PHYSICIAN ASSISTANT

## 2024-08-06 PROCEDURE — 3078F DIAST BP <80 MM HG: CPT | Performed by: PHYSICIAN ASSISTANT

## 2024-08-06 ASSESSMENT — FIBROSIS 4 INDEX: FIB4 SCORE: 0.34

## 2024-08-06 NOTE — PROGRESS NOTES
CC:    Chief Complaint   Patient presents with    Annual Exam       HISTORY OF THE PRESENT ILLNESS:  24 y.o. male presenting for annual physical exam.   Pt's neck xray returned normal with normal disc heights. His first appt with physical therapy in two weeks.   ALT mildly elevated at 53.      No problem-specific Assessment & Plan notes found for this encounter.    Allergies: Patient has no known allergies.    Current Outpatient Medications Ordered in Epic   Medication Sig Dispense Refill    cyclobenzaprine (FLEXERIL) 5 mg tablet Take 1-2 Tablets by mouth 3 times a day as needed for Moderate Pain or Mild Pain. 20 Tablet 0    methylPREDNISolone (MEDROL DOSEPAK) 4 MG Tablet Therapy Pack Use as directed (Patient not taking: Reported on 7/30/2024) 21 Each 0     No current [facility]-ordered facility-administered medications on file.       No past medical history on file.    No past surgical history on file.    Social History     Tobacco Use    Smoking status: Never    Smokeless tobacco: Current     Types: Chew   Vaping Use    Vaping status: Never Used   Substance Use Topics    Alcohol use: Yes     Comment: 2 per week    Drug use: Not Currently     Frequency: 2.0 times per week     Types: Marijuana, Inhaled     Comment: weekends       Social History     Social History Narrative    Not on file       Family History   Family history unknown: Yes       ROS:     - Constitutional: Negative for fever, chills, unexpected weight change, and fatigue/generalized weakness.     - HEENT: Negative for headaches, vision changes, hearing changes, ear pain, ear discharge, rhinorrhea, sinus congestion, sore throat, and neck pain.      - Respiratory: Negative for cough, sputum production, chest congestion, dyspnea, wheezing, and crackles.      - Cardiovascular: Positive for CP.  Negative for palpitations, orthopnea, and bilateral lower extremity edema.           Health Maintenance  Cholesterol Screening: Fasting lipid panel  Diabetes Screening:  "Fasting blood sugar  Exercise: Regular exercise.   Substance Abuse: None  Safe in relationship Yes     Cancer screening  Colorectal Cancer Screening: N/A         Exam: /78   Pulse 82   Temp 36.2 °C (97.1 °F) (Temporal)   Resp 12   Ht 1.854 m (6' 1\")   Wt (!) 127 kg (280 lb)   SpO2 95%  Body mass index is 36.94 kg/m².    General: Normal appearing. No distress.  HEENT: Normocephalic. Eyes conjunctiva clear lids without ptosis, pupils equal and reactive to light accommodation, ears normal shape and contour, canals are clear bilaterally, tympanic membranes are benign, nasal mucosa benign, oropharynx is without erythema, edema or exudates.   Neck: Supple without JVD or bruit. No thyromegaly. No cervical lymphadenopathy  Pulmonary: Clear to ausculation.  Normal effort. No rales, ronchi, or wheezing.  Cardiovascular: Regular rate and rhythm without murmur, gallops or rubs  Neurologic: Grossly nonfocal, gait normal, normal muscle tone  Skin: Warm and dry.  No obvious lesions.  Musculoskeletal: No extremity cyanosis, clubbing, or edema. No deformity  Psych: Normal mood and affect. Alert and oriented x3. Judgment and insight is normal.    Please note that this dictation was created using voice recognition software. I have made every reasonable attempt to correct obvious errors, but I expect that there are errors of grammar and possibly content that I did not discover before finalizing the note.      Assessment/Plan    1. Class 2 obesity due to excess calories without serious comorbidity with body mass index (BMI) of 36.0 to 36.9 in adult  Discussed weight loss. His weight today at 280. Discussed eliminating Red Bull sugary drinks from diet. I would like him to target 260 lbs in six months.     2. Physical exam, annual  PE conducted. Notify me if CP not improving with physical therapy    3. Elevated LFTs      "

## 2024-12-17 ENCOUNTER — OFFICE VISIT (OUTPATIENT)
Dept: MEDICAL GROUP | Facility: PHYSICIAN GROUP | Age: 24
End: 2024-12-17
Payer: COMMERCIAL

## 2024-12-17 VITALS
HEART RATE: 89 BPM | TEMPERATURE: 97.9 F | BODY MASS INDEX: 35.25 KG/M2 | RESPIRATION RATE: 14 BRPM | OXYGEN SATURATION: 95 % | WEIGHT: 266 LBS | DIASTOLIC BLOOD PRESSURE: 68 MMHG | HEIGHT: 73 IN | SYSTOLIC BLOOD PRESSURE: 98 MMHG

## 2024-12-17 DIAGNOSIS — S29.001A INJURY OF MUSCLE OF CHEST WALL: ICD-10-CM

## 2024-12-17 PROCEDURE — 99213 OFFICE O/P EST LOW 20 MIN: CPT | Performed by: PHYSICIAN ASSISTANT

## 2024-12-17 PROCEDURE — 3078F DIAST BP <80 MM HG: CPT | Performed by: PHYSICIAN ASSISTANT

## 2024-12-17 PROCEDURE — 3074F SYST BP LT 130 MM HG: CPT | Performed by: PHYSICIAN ASSISTANT

## 2024-12-17 ASSESSMENT — FIBROSIS 4 INDEX: FIB4 SCORE: 0.34

## 2024-12-17 NOTE — PROGRESS NOTES
"CC:  Chief Complaint   Patient presents with    Follow-Up       HISTORY OF PRESENT ILLNESS: Patient is a 24 y.o. male established patient presenting with issues below  Pt's physical therapy treatment helped with his L shoulder but chest still hurts.     Current Outpatient Medications   Medication Sig Dispense Refill    cyclobenzaprine (FLEXERIL) 5 mg tablet Take 1-2 Tablets by mouth 3 times a day as needed for Moderate Pain or Mild Pain. 20 Tablet 0     No current facility-administered medications for this visit.        ROS:     ROS    Exam:    BP 98/68   Pulse 89   Temp 36.6 °C (97.9 °F) (Temporal)   Resp 14   Ht 1.854 m (6' 1\")   Wt 121 kg (266 lb)   SpO2 95%  Body mass index is 35.09 kg/m².    General:  Well nourished, well developed male in NAD  Head is grossly normal.  Neck: Supple.   Chest: Positive for chest wall tenderness, no swelling or masses noted, no deformities  Skin: Warm and dry. No obvious lesions  Neuro: Normal muscle tone. Gait normal. Alert and oriented.  Psych: Normal mood and affect      Please note that this dictation was created using voice recognition software. I have made every reasonable attempt to correct obvious errors, but I expect that there are errors of grammar and possibly content that I did not discover before finalizing the note.        Assessment/Plan:  1. Injury of muscle of chest wall  Will get ortho consult. Unclear etiology  - Referral to Orthopedics             "

## 2025-03-12 DIAGNOSIS — M25.512 ACUTE PAIN OF LEFT SHOULDER: ICD-10-CM

## 2025-03-18 NOTE — Clinical Note
REFERRAL APPROVAL NOTICE         Sent on March 18, 2025                   Cooper Caldwell  [address] [facility] [address]                   Dear Mr. Caldwell,    After a careful review of the medical information and benefit coverage, [facility] has processed your referral. See below for additional details.    If applicable, you must be actively enrolled with your insurance for coverage of the authorized service. If you have any questions regarding your coverage, please contact your insurance directly.    REFERRAL INFORMATION   Referral #:  [id number]  Referred-To Department    Referred-By Provider:  Orthopedics    NICCI Vaughn   [facility] (joint)      [address] [facility] [address]  [facility] [address]  [phone] [address] [place] [facility] [address]  [phone]    Referral Start Date:  03/12/2025  Referral End Date:   03/12/2026             SCHEDULING  If you do not already have an appointment, please call [phone] to make an appointment.     MORE INFORMATION  If you do not already have a [employer] account, sign up at: [email or web address]  You can access your medical information, make appointments, see lab results, billing information, and more.  If you have questions regarding this referral, please contact  the [facility] Referrals department at:             [phone]. Monday - Friday 8:00AM - 5:00PM.     Sincerely,    [facility]

## 2025-07-18 ENCOUNTER — OFFICE VISIT (OUTPATIENT)
Dept: URGENT CARE | Facility: CLINIC | Age: 25
End: 2025-07-18

## 2025-07-18 VITALS
SYSTOLIC BLOOD PRESSURE: 116 MMHG | TEMPERATURE: 97.5 F | WEIGHT: 268 LBS | HEART RATE: 73 BPM | DIASTOLIC BLOOD PRESSURE: 70 MMHG | OXYGEN SATURATION: 98 % | RESPIRATION RATE: 16 BRPM | BODY MASS INDEX: 35.52 KG/M2 | HEIGHT: 73 IN

## 2025-07-18 DIAGNOSIS — Z02.1 PRE-EMPLOYMENT DRUG SCREENING: Primary | ICD-10-CM

## 2025-07-18 DIAGNOSIS — Z02.1 ENCOUNTER FOR PRE-EMPLOYMENT HEALTH SCREENING EXAMINATION: ICD-10-CM

## 2025-07-18 PROCEDURE — 8907 PR URINE COLLECT ONLY

## 2025-07-18 PROCEDURE — 8915 PR COMPREHENSIVE PHYSICAL

## 2025-07-18 PROCEDURE — 94010 BREATHING CAPACITY TEST: CPT

## 2025-07-18 ASSESSMENT — FIBROSIS 4 INDEX: FIB4 SCORE: 0.35

## 2025-07-18 NOTE — PROGRESS NOTES
"Subjective:   Cooper Caldwell is a 25 y.o. male who presents for Employment Physical ([employer], vision) and Drug / Alcohol Assessment      HPI:    Patient presents for employment physical  Denies acute concerns today.   See scanned documentation    ROS As above in HPI    Medications:    Medications Ordered Prior to Encounter[1]     Allergies:   Patient has no known allergies.    Problem List:   Problem List[2]     Surgical History:  No past surgical history on file.    Past Social Hx:   Social History[3]       Problem list, medications, and allergies reviewed by myself today in Epic.     Objective:     /70   Pulse 73   Temp 36.4 °C (97.5 °F) (Temporal)   Resp 16   Ht 1.842 m (6' 0.5\")   Wt 122 kg (268 lb)   SpO2 98%   BMI 35.85 kg/m²         Assessment/Plan:       Diagnosis and associated orders:   1. Pre-employment drug screening    2. Encounter for pre-employment health screening examination        Comments/MDM:         See scanned documentation.     Return to clinic or go to ED if symptoms worsen or persist. Indications for ED discussed at length. Patient/Parent/Guardian voices understanding. Follow-up with your primary care provider in 3-5 days. Red flag symptoms discussed. All side effects of medication discussed including allergic response, GI upset, tendon injury, rash, sedation etc.    Please note that this dictation was created using voice recognition software. I have made a reasonable attempt to correct obvious errors, but I expect that there are errors of grammar and possibly content that I did not discover before finalizing the note.    This note was electronically signed by KATHY Sarah         [1]   Current Outpatient Medications on File Prior to Visit   Medication Sig Dispense Refill    gabapentin (NEURONTIN) 300 MG Cap Take 1 Capsule by mouth 3 times a day. 90 Capsule 1    cyclobenzaprine (FLEXERIL) 5 mg tablet Take 1-2 Tablets by mouth 3 " times a day as needed for Moderate Pain or Mild Pain. 20 Tablet 0     No current facility-administered medications on file prior to visit.   [2]   Patient Active Problem List  Diagnosis    Cervical radiculopathy at C7    Elevated LFTs   [3]   Social History  Tobacco Use    Smoking status: Never    Smokeless tobacco: Current     Types: Chew   Vaping Use    Vaping status: Never Used   Substance Use Topics    Alcohol use: Yes     Comment: 2 per week    Drug use: Not Currently     Frequency: 2.0 times per week     Types: Marijuana, Inhaled     Comment: weekends